# Patient Record
Sex: MALE | Race: WHITE | Employment: FULL TIME | URBAN - METROPOLITAN AREA
[De-identification: names, ages, dates, MRNs, and addresses within clinical notes are randomized per-mention and may not be internally consistent; named-entity substitution may affect disease eponyms.]

---

## 2017-02-27 ENCOUNTER — OFFICE VISIT (OUTPATIENT)
Dept: FAMILY MEDICINE CLINIC | Facility: CLINIC | Age: 32
End: 2017-02-27

## 2017-02-27 VITALS
OXYGEN SATURATION: 99 % | TEMPERATURE: 99.8 F | BODY MASS INDEX: 30.8 KG/M2 | HEIGHT: 71 IN | DIASTOLIC BLOOD PRESSURE: 79 MMHG | SYSTOLIC BLOOD PRESSURE: 153 MMHG | HEART RATE: 104 BPM | RESPIRATION RATE: 18 BRPM | WEIGHT: 220 LBS

## 2017-02-27 DIAGNOSIS — J11.1 INFLUENZA: Primary | ICD-10-CM

## 2017-02-27 RX ORDER — OSELTAMIVIR PHOSPHATE 75 MG/1
75 CAPSULE ORAL 2 TIMES DAILY
Qty: 10 CAP | Refills: 0 | Status: SHIPPED | OUTPATIENT
Start: 2017-02-27 | End: 2017-03-04

## 2017-02-27 RX ORDER — CODEINE PHOSPHATE AND GUAIFENESIN 10; 100 MG/5ML; MG/5ML
5 SOLUTION ORAL
Qty: 120 ML | Refills: 0 | Status: SHIPPED | OUTPATIENT
Start: 2017-02-27 | End: 2017-06-20

## 2017-02-27 NOTE — PATIENT INSTRUCTIONS
Influenza (Flu): Care Instructions  Your Care Instructions  Influenza (flu) is an infection in the lungs and breathing passages. It is caused by the influenza virus. There are different strains, or types, of the flu virus from year to year. Unlike the common cold, the flu comes on suddenly and the symptoms, such as a cough, congestion, fever, chills, fatigue, aches, and pains, are more severe. These symptoms may last up to 10 days. Although the flu can make you feel very sick, it usually doesn't cause serious health problems. Home treatment is usually all you need for flu symptoms. But your doctor may prescribe antiviral medicine to prevent other health problems, such as pneumonia, from developing. Older people and those who have a long-term health condition, such as lung disease, are most at risk for having pneumonia or other health problems. Follow-up care is a key part of your treatment and safety. Be sure to make and go to all appointments, and call your doctor if you are having problems. Its also a good idea to know your test results and keep a list of the medicines you take. How can you care for yourself at home? · Get plenty of rest.  · Drink plenty of fluids, enough so that your urine is light yellow or clear like water. If you have kidney, heart, or liver disease and have to limit fluids, talk with your doctor before you increase the amount of fluids you drink. · Take an over-the-counter pain medicine if needed, such as acetaminophen (Tylenol), ibuprofen (Advil, Motrin), or naproxen (Aleve), to relieve fever, headache, and muscle aches. Read and follow all instructions on the label. No one younger than 20 should take aspirin. It has been linked to Reye syndrome, a serious illness. · Do not smoke. Smoking can make the flu worse. If you need help quitting, talk to your doctor about stop-smoking programs and medicines. These can increase your chances of quitting for good.   · Breathe moist air from a hot shower or from a sink filled with hot water to help clear a stuffy nose. · Before you use cough and cold medicines, check the label. These medicines may not be safe for young children or for people with certain health problems. · If the skin around your nose and lips becomes sore, put some petroleum jelly on the area. · To ease coughing:  ¨ Drink fluids to soothe a scratchy throat. ¨ Suck on cough drops or plain hard candy. ¨ Take an over-the-counter cough medicine that contains dextromethorphan to help you get some sleep. Read and follow all instructions on the label. ¨ Raise your head at night with an extra pillow. This may help you rest if coughing keeps you awake. · Take any prescribed medicine exactly as directed. Call your doctor if you think you are having a problem with your medicine. To avoid spreading the flu  · Wash your hands regularly, and keep your hands away from your face. · Stay home from school, work, and other public places until you are feeling better and your fever has been gone for at least 24 hours. The fever needs to have gone away on its own without the help of medicine. · Ask people living with you to talk to their doctors about preventing the flu. They may get antiviral medicine to keep from getting the flu from you. · To prevent the flu in the future, get a flu vaccine every fall. Encourage people living with you to get the vaccine. · Cover your mouth when you cough or sneeze. When should you call for help? Call 911 anytime you think you may need emergency care. For example, call if:  · You have severe trouble breathing. Call your doctor now or seek immediate medical care if:  · You have new or worse trouble breathing. · You seem to be getting much sicker. · You feel very sleepy or confused. · You have a new or higher fever. · You get a new rash.   Watch closely for changes in your health, and be sure to contact your doctor if:  · You begin to get better and then get worse. · You are not getting better after 1 week. Where can you learn more? Go to http://gregorio-paresh.info/. Enter U540 in the search box to learn more about \"Influenza (Flu): Care Instructions. \"  Current as of: May 23, 2016  Content Version: 11.1  © 9947-3501 [employer]. Care instructions adapted under license by [employer] (which disclaims liability or warranty for this information). If you have questions about a medical condition or this instruction, always ask your healthcare professional. [address] any warranty or liability for your use of this information.

## 2017-02-27 NOTE — PROGRESS NOTES
HISTORY OF PRESENT ILLNESS  Madelyn Herbert is a 27 y.o. male. HPI Comments: Presents with 2 days history of cough, sore throat, myalgias, chills. No measured temperatures. His wife is positive for the flu. He has taken Nyquil/Dayquil, and these help somewhat. Flu    Associated symptoms include congestion, headaches, sore throat and cough. Pertinent negatives include no chest pain, no vomiting and no shortness of breath. Past Medical History:   Diagnosis Date    Gout     Hypertension        History reviewed. No pertinent surgical history. History   Smoking Status    Never Smoker   Smokeless Tobacco    Never Used     Current Outpatient Prescriptions   Medication Sig    lisinopril (PRINIVIL, ZESTRIL) 10 mg tablet Take 1 Tab by mouth daily. Indications: HYPERTENSION    meloxicam (MOBIC) 15 mg tablet Take 1 Tab by mouth daily. Indications: GOUT     No current facility-administered medications for this visit. Review of Systems   Constitutional: Positive for chills and malaise/fatigue. HENT: Positive for congestion and sore throat. Negative for ear pain. Respiratory: Positive for cough and sputum production. Negative for shortness of breath. Cardiovascular: Negative for chest pain. Gastrointestinal: Negative for nausea and vomiting. Musculoskeletal: Positive for myalgias. Neurological: Positive for headaches. Visit Vitals    /79    Pulse (!) 104    Temp 99.8 °F (37.7 °C) (Oral)    Resp 18    Ht 5' 11\" (1.803 m)    Wt 220 lb (99.8 kg)    SpO2 99%    BMI 30.68 kg/m2       Physical Exam   Constitutional: He is oriented to person, place, and time. He appears well-developed and well-nourished. No distress. Somewhat flushed   HENT:   Right Ear: Tympanic membrane, external ear and ear canal normal.   Left Ear: Tympanic membrane, external ear and ear canal normal.   Nose: Mucosal edema present. Mouth/Throat: Oropharynx is clear and moist.   Neck: Neck supple.  No thyromegaly present. Cardiovascular: Normal rate and regular rhythm. Exam reveals no gallop and no friction rub. No murmur heard. Pulmonary/Chest: Effort normal and breath sounds normal. No respiratory distress. Lymphadenopathy:     He has no cervical adenopathy. Neurological: He is alert and oriented to person, place, and time. Skin: Skin is warm and dry. Psychiatric: He has a normal mood and affect. His behavior is normal. Judgment and thought content normal.       ASSESSMENT and PLAN    ICD-10-CM ICD-9-CM    1. Influenza J11.1 487.1 oseltamivir (TAMIFLU) 75 mg capsule      guaiFENesin-codeine (ROBITUSSIN AC) 100-10 mg/5 mL solution     Follow-up Disposition:  Return if symptoms worsen or fail to improve. He should make an appointment to see me in 2-3 weeks for his HTN  The following changes in treatment are made: Tamiflu, Robitussin AC for symptoms. Push fluids, frequent handwashing,fever control. He declines a note for work. reviewed medications and side effects in detail  Plan of care reviewed - patient verbalize(s) understanding and agreement.

## 2017-06-20 ENCOUNTER — OFFICE VISIT (OUTPATIENT)
Dept: FAMILY MEDICINE CLINIC | Facility: CLINIC | Age: 32
End: 2017-06-20

## 2017-06-20 VITALS
RESPIRATION RATE: 16 BRPM | HEIGHT: 71 IN | OXYGEN SATURATION: 97 % | TEMPERATURE: 96.6 F | BODY MASS INDEX: 30.94 KG/M2 | WEIGHT: 221 LBS | HEART RATE: 83 BPM | SYSTOLIC BLOOD PRESSURE: 128 MMHG | DIASTOLIC BLOOD PRESSURE: 83 MMHG

## 2017-06-20 DIAGNOSIS — I10 ESSENTIAL HYPERTENSION WITH GOAL BLOOD PRESSURE LESS THAN 140/90: ICD-10-CM

## 2017-06-20 DIAGNOSIS — M10.9 ACUTE GOUT OF RIGHT FOOT, UNSPECIFIED CAUSE: Primary | ICD-10-CM

## 2017-06-20 RX ORDER — ALLOPURINOL 100 MG/1
100 TABLET ORAL DAILY
Qty: 90 TAB | Refills: 0 | Status: SHIPPED | OUTPATIENT
Start: 2017-06-20 | End: 2017-11-22 | Stop reason: SDUPTHER

## 2017-06-20 RX ORDER — LISINOPRIL 10 MG/1
10 TABLET ORAL DAILY
Qty: 90 TAB | Refills: 0 | Status: SHIPPED | OUTPATIENT
Start: 2017-06-20 | End: 2017-10-08 | Stop reason: SDUPTHER

## 2017-06-20 RX ORDER — MELOXICAM 15 MG/1
15 TABLET ORAL DAILY
Qty: 90 TAB | Refills: 3 | Status: SHIPPED | OUTPATIENT
Start: 2017-06-20 | End: 2017-10-10 | Stop reason: SDUPTHER

## 2017-06-20 NOTE — MR AVS SNAPSHOT
Visit Information Date & Time Provider Department Dept. Phone Encounter #  
 6/20/2017  2:15 PM Geeta Christopher MD [facility] [phone] [id number] Follow-up Instructions Return in about 3 months (around 9/4/2017). Upcoming Health Maintenance Date Due DTaP/Tdap/Td series (1 - Tdap) 2/25/2004 INFLUENZA AGE 9 TO ADULT 8/1/2017 Allergies as of 6/20/2017  Review Complete On: 6/20/2017 By: Geeta Christopher MD  
 No Known Allergies Current Immunizations  Never Reviewed No immunizations on file. Not reviewed this visit You Were Diagnosed With   
  
 Codes Comments Acute gout of right foot, unspecified cause    -  Primary ICD-10-CM: M10.9 ICD-9-CM: 274.01 Vitals BP Pulse Temp Resp Height(growth percentile) Weight(growth percentile) 128/83 83 96.6 °F (35.9 °C) 16 5' 11\" (1.803 m) 221 lb (100.2 kg) SpO2 BMI Smoking Status 97% 30.82 kg/m2 Never Smoker Vitals History BMI and BSA Data Body Mass Index Body Surface Area  
 30.82 kg/m 2 2.24 m 2 Preferred Pharmacy Pharmacy Name Phone [facility] [phone] [place] [address] [phone] Your Updated Medication List  
  
   
This list is accurate as of: 6/20/17  3:09 PM.  Always use your most recent med list.  
  
  
  
  
 allopurinol 100 mg tablet Commonly known as:  Jennifer Cano Take 1 Tab by mouth daily. Indications: prevention of acute gout attack  
  
 lisinopril 10 mg tablet Commonly known as:  Zahida Camel Take 1 Tab by mouth daily. Indications: HYPERTENSION  
  
 meloxicam 15 mg tablet Commonly known as:  MOBIC Take 1 Tab by mouth daily. Indications: GOUT Prescriptions Sent to Pharmacy Refills  
 meloxicam (MOBIC) 15 mg tablet 3 Sig: Take 1 Tab by mouth daily. Indications: GOUT  Class: Normal  
 Pharmacy: [facility] [address] [facility] [address] Ph #: [phone] Route: Oral  
 allopurinol (ZYLOPRIM) 100 mg tablet 0 Sig: Take 1 Tab by mouth daily. Indications: prevention of acute gout attack Class: Normal  
 Pharmacy: [facility]  [address] Ph #: [phone] Route: Oral  
  
Follow-up Instructions Return in about 3 months (around 9/4/2017). Introducing [facility]! [facility] introduces VODECLIC patient portal. Now you can access parts of your medical record, email your doctor's office, and request medication refills online. 1. In your internet browser, go to [email or web address] 2. Click on the First Time User? Click Here link in the Sign In box. You will see the New Member Sign Up page. 3. Enter your VODECLIC Access Code exactly as it appears below. You will not need to use this code after youve completed the sign-up process. If you do not sign up before the expiration date, you must request a new code. · VODECLIC Access Code: [email or web address] Expires: 9/18/2017  3:09 PM 
 
4. Enter the last four digits of your Social Security Number (xxxx) and Date of Birth (mm/dd/yyyy) as indicated and click Submit. You will be taken to the next sign-up page. 5. Create a Walltikt ID. This will be your VODECLIC login ID and cannot be changed, so think of one that is secure and easy to remember. 6. Create a VODECLIC password. You can change your password at any time. 7. Enter your Password Reset Question and Answer. This can be used at a later time if you forget your password. 8. Enter your e-mail address. You will receive e-mail notification when new information is available in [address]. 9. Click Sign Up. You can now view and download portions of your medical record. 10. Click the Download Summary menu link to download a portable copy of your medical information. If you have questions, please visit the Frequently Asked Questions section of the Bionomics website. Remember, Bionomics is NOT to be used for urgent needs. For medical emergencies, dial 911. Now available from your iPhone and Android! Please provide this summary of care documentation to your next provider. Your primary care clinician is listed as Aguilar Velasco. If you have any questions after today's visit, please call [phone].

## 2017-06-20 NOTE — LETTER
6/20/2017 3:24 PM 
 
Mr. Betsy Cook 
[address] [facility] [id number] Mr. Flory Hurtado was seen in the office today. H.susan was off work for the past 2-3 days, and may return to work tomorrow Sincerely, Ny Morgan MD

## 2017-06-20 NOTE — PROGRESS NOTES
HISTORY OF PRESENT ILLNESS  Madelyn Ross is a 27 y.o. male. HPI Comments: Presents with acute gout attack involving his right foot (laterally). He noted redness, warmth, tenderness and pain 2 days ago. He took some Colchicine, and it has now abated (still a little bit tender). This is the first episode he has had in the past year. He stopped taking the previously prescribed Allopurinol, because he wasn't any episodes. He is also out of Meloxicam. He needs a note for work. He is taking his BP medication regularly. Gout   Pertinent negatives include no chest pain, no headaches and no shortness of breath. Past Medical History:   Diagnosis Date    Gout     Hypertension        History reviewed. No pertinent surgical history. History   Smoking Status    Never Smoker   Smokeless Tobacco    Never Used     Current Outpatient Prescriptions   Medication Sig    lisinopril (PRINIVIL, ZESTRIL) 10 mg tablet Take 1 Tab by mouth daily. Indications: HYPERTENSION    meloxicam (MOBIC) 15 mg tablet Take 1 Tab by mouth daily. Indications: GOUT     No current facility-administered medications for this visit. Review of Systems   Constitutional: Negative for chills and fever. Respiratory: Negative for shortness of breath. Cardiovascular: Negative for chest pain, palpitations and leg swelling. Gastrointestinal: Negative for nausea and vomiting. Musculoskeletal: Positive for gout. Neurological: Negative for tingling, focal weakness and headaches. Psychiatric/Behavioral: The patient does not have insomnia. Visit Vitals    /83    Pulse 83    Temp 96.6 °F (35.9 °C)    Resp 16    Ht 5' 11\" (1.803 m)    Wt 221 lb (100.2 kg)    SpO2 97%    BMI 30.82 kg/m2       Physical Exam   Constitutional: He is oriented to person, place, and time. He appears well-developed and well-nourished. No distress. Neck: Neck supple. No thyromegaly present. Cardiovascular: Normal rate and regular rhythm.   Exam reveals no gallop and no friction rub. No murmur heard. Pulmonary/Chest: Effort normal and breath sounds normal. No respiratory distress. Musculoskeletal: He exhibits no edema. Right foot: There is no tenderness and no swelling. Feet:    Lymphadenopathy:     He has no cervical adenopathy. Neurological: He is alert and oriented to person, place, and time. Skin: Skin is warm and dry. Psychiatric: He has a normal mood and affect. His behavior is normal. Judgment and thought content normal.       ASSESSMENT and PLAN    ICD-10-CM ICD-9-CM    1. Acute gout of right foot, unspecified cause M10.9 274.01 meloxicam (MOBIC) 15 mg tablet      allopurinol (ZYLOPRIM) 100 mg tablet   2. Essential hypertension with goal blood pressure less than 140/90 I10 401.9 lisinopril (PRINIVIL, ZESTRIL) 10 mg tablet     Follow-up Disposition:  Return in about 3 months (around 9/4/2017). the following changes in treatment are made: Resume Allopurinol - discussed the need to continue this indefinitely. Lisinopril refilled for 90 days, Meloxicam refilled. He has some colchicine at home for acute use (can't afford to get ColCrys). Will check his uric acid at his next visit. reviewed medications and side effects in detail  Plan of care reviewed - patient verbalize(s) understanding and agreement.

## 2017-08-25 DIAGNOSIS — B00.1 HERPES LABIALIS: Primary | ICD-10-CM

## 2017-08-25 RX ORDER — VALACYCLOVIR HYDROCHLORIDE 1 G/1
2000 TABLET, FILM COATED ORAL 2 TIMES DAILY
Qty: 4 TAB | Refills: 5 | Status: SHIPPED | OUTPATIENT
Start: 2017-08-25 | End: 2018-07-30 | Stop reason: SDUPTHER

## 2017-08-25 NOTE — PROGRESS NOTES
Patient called to request a prescription for Valtrex for an oral HSV outbreak this week. He has taken this in the past with good results. Will do this for him.

## 2017-10-08 DIAGNOSIS — I10 ESSENTIAL HYPERTENSION WITH GOAL BLOOD PRESSURE LESS THAN 140/90: ICD-10-CM

## 2017-10-10 RX ORDER — LISINOPRIL 10 MG/1
TABLET ORAL
Qty: 90 TAB | Refills: 0 | Status: SHIPPED | OUTPATIENT
Start: 2017-10-10 | End: 2018-02-12 | Stop reason: SDUPTHER

## 2017-10-31 ENCOUNTER — OFFICE VISIT (OUTPATIENT)
Dept: FAMILY MEDICINE CLINIC | Facility: CLINIC | Age: 32
End: 2017-10-31

## 2017-10-31 VITALS
DIASTOLIC BLOOD PRESSURE: 82 MMHG | WEIGHT: 218 LBS | OXYGEN SATURATION: 99 % | BODY MASS INDEX: 30.52 KG/M2 | SYSTOLIC BLOOD PRESSURE: 108 MMHG | TEMPERATURE: 98.5 F | HEIGHT: 71 IN | HEART RATE: 87 BPM | RESPIRATION RATE: 16 BRPM

## 2017-10-31 DIAGNOSIS — K12.0 RECURRENT APHTHOUS ULCER: Primary | ICD-10-CM

## 2017-10-31 RX ORDER — DEXAMETHASONE 0.5 MG/5ML
SOLUTION ORAL
Qty: 240 ML | Refills: 2 | Status: SHIPPED | OUTPATIENT
Start: 2017-10-31 | End: 2018-02-12

## 2017-10-31 NOTE — MR AVS SNAPSHOT
Visit Information Date & Time Provider Department Dept. Phone Encounter #  
 10/31/2017  3:15 PM Mandi Caldwell MD [facility] [phone] [id number] Follow-up Instructions Return if symptoms worsen or fail to improve. Upcoming Health Maintenance Date Due DTaP/Tdap/Td series (1 - Tdap) 2/25/2004 INFLUENZA AGE 9 TO ADULT 8/1/2017 Allergies as of 10/31/2017  Review Complete On: 10/31/2017 By: Mandi Caldwell MD  
 No Known Allergies Current Immunizations  Never Reviewed No immunizations on file. Not reviewed this visit You Were Diagnosed With   
  
 Codes Comments Recurrent aphthous ulcer    -  Primary ICD-10-CM: K12.0 ICD-9-CM: 528.2 Vitals BP Pulse Temp Resp Height(growth percentile) Weight(growth percentile) 108/82 87 98.5 °F (36.9 °C) 16 5' 11\" (1.803 m) 218 lb (98.9 kg) SpO2 BMI Smoking Status 99% 30.4 kg/m2 Never Smoker Vitals History BMI and BSA Data Body Mass Index Body Surface Area  
 30.4 kg/m 2 2.23 m 2 Preferred Pharmacy Pharmacy Name Phone [facility] [address] [place] [address] [phone] Your Updated Medication List  
  
   
This list is accurate as of: 10/31/17  4:08 PM.  Always use your most recent med list.  
  
  
  
  
 allopurinol 100 mg tablet Commonly known as:  Ladonna Butler Take 1 Tab by mouth daily. Indications: prevention of acute gout attack  
  
 dexamethasone 0.5 mg/5 mL solution Commonly known as:  DECADRON Swish and spit 5ml qid - hold in mouth for 5 minutes, do not rinse  
  
 lisinopril 10 mg tablet Commonly known as:  PRINIVIL, ZESTRIL  
TAKE 1 TABLET BY MOUTH DAILY FOR HIGH BLOOD PRESSURE  
  
 meloxicam 15 mg tablet Commonly known as:  MOBIC  
TAKE 1 TABLET BY MOUTH DAILY FOR GOUT  
  
 valACYclovir 1 gram tablet Commonly known as:  VALTREX Take 2 Tabs by mouth two (2) times a day. Indications: HERPES LABIALIS Prescriptions Sent to Pharmacy Refills  
 dexamethasone (DECADRON) 0.5 mg/5 mL solution 2 Sig: Swish and spit 5ml qid - hold in mouth for 5 minutes, do not rinse Class: Normal  
 Pharmacy: [facility]  [address] Ph #: [phone] Follow-up Instructions Return if symptoms worsen or fail to improve. Patient Instructions Canker Sore: Care Instructions Your Care Instructions Canker sores are painful white sores in the mouth. They usually begin with a tingling feeling, followed by a red spot or bump that turns white. Canker sores appear most often on the tongue, inside the cheeks, and inside the lips. They can be very painful and can make talking, eating, and drinking difficult. A canker sore may form after an injury or stretching of tissues in the mouth, which can happen, for example, during a dental procedure or teeth cleaning. If you accidentally bite your tongue or the inside of your cheek, you may end up with a canker sore. Other possible causes are infection, certain foods, and stress. Canker sores are not contagious. The pain from your canker sore should decrease in 7 to 10 days, and it should heal completely in 1 to 3 weeks. In most cases, a canker sore will go away by itself. Home treatment can ease pain and discomfort. If you have a large or deep canker sore that does not seem to be getting better after 2 weeks, your doctor may prescribe medicine. Canker sores often come back again. Follow-up care is a key part of your treatment and safety. Be sure to make and go to all appointments, and call your doctor if you are having problems. It's also a good idea to know your test results and keep a list of the medicines you take. How can you care for yourself at home? · Drink cold liquids, such as water or iced tea, or eat flavored ice pops or frozen juices. Use a straw to keep the liquid from coming in contact with your canker sore. · Eat soft, bland foods that are easy to chew and swallow, such as ice cream, custard, applesauce, cottage cheese, macaroni and cheese, soft-cooked eggs, yogurt, or cream soups. · Cut foods into small pieces, or grind, mash, blend, or puree foods to make them easier to chew and swallow. · While your canker sore heals, avoid coffee, chocolate, spicy and salty foods, citrus fruits, nuts, seeds, and tomatoes. · To soothe your canker sore and help it heal: ¨ Use an over-the-counter numbing medicine, such as Orabase or Anbesol. ¨ Dab a bit of Milk of Magnesia on the canker sore 3 or 4 times a day. · Put ice on your sore to reduce the pain. · Take anti-inflammatory medicines to reduce pain, as needed. These include ibuprofen (Advil, Motrin) and naproxen (Aleve). Read and follow all instructions on the label. · Use a soft-bristle toothbrush, and brush your teeth well but carefully. · Do not smoke or use spit tobacco. Tobacco can cause mouth problems and slow healing. If you need help quitting, talk to your doctor about stop-smoking programs and medicines. These can increase your chances of quitting for good. When should you call for help? Call your doctor now or seek immediate medical care if: 
? · You have signs of infection, such as: 
¨ Increased pain, swelling, warmth, or redness. ¨ Red streaks leading from the area. ¨ Pus draining from the area. ¨ A fever. ? Watch closely for changes in your health, and be sure to contact your doctor if: 
? · You do not get better as expected. Where can you learn more? Go to http://gregorio-paresh.info/. Enter Z791 in the search box to learn more about \"Canker Sore: Care Instructions. \" Current as of: May 12, 2017 Content Version: 11.4 © 2298-9534 Healthwise, Incorporated. Care instructions adapted under license by [employer] (which disclaims liability or warranty for this information). If you have questions about a medical condition or this instruction, always ask your healthcare professional. [address] any warranty or liability for your use of this information. Introducing [facility]! [facility] introduces Ivisys patient portal. Now you can access parts of your medical record, email your doctor's office, and request medication refills online. 1. In your internet browser, go to [email or web address] 2. Click on the First Time User? Click Here link in the Sign In box. You will see the New Member Sign Up page. 3. Enter your Ivisys Access Code exactly as it appears below. You will not need to use this code after youve completed the sign-up process. If you do not sign up before the expiration date, you must request a new code. · Ivisys Access Code: [email or web address] Expires: 1/29/2018  4:08 PM 
 
4. Enter the last four digits of your Social Security Number (xxxx) and Date of Birth (mm/dd/yyyy) as indicated and click Submit. You will be taken to the next sign-up page. 5. Create a Ivisys ID. This will be your Ivisys login ID and cannot be changed, so think of one that is secure and easy to remember. 6. Create a Ivisys password. You can change your password at any time. 7. Enter your Password Reset Question and Answer. This can be used at a later time if you forget your password. 8. Enter your e-mail address. You will receive e-mail notification when new information is available in [address] 9. Click Sign Up. You can now view and download portions of your medical record. 10. Click the Download Summary menu link to download a portable copy of your medical information.  
 
If you have questions, please visit the Frequently Asked Questions section of the AppMakr. Remember, Resilinchart is NOT to be used for urgent needs. For medical emergencies, dial 911. Now available from your iPhone and Android! Please provide this summary of care documentation to your next provider. Your primary care clinician is listed as Obi Sebastian. If you have any questions after today's visit, please call [phone].

## 2017-10-31 NOTE — PATIENT INSTRUCTIONS
Canker Sore: Care Instructions  Your Care Instructions  Canker sores are painful white sores in the mouth. They usually begin with a tingling feeling, followed by a red spot or bump that turns white. Canker sores appear most often on the tongue, inside the cheeks, and inside the lips. They can be very painful and can make talking, eating, and drinking difficult. A canker sore may form after an injury or stretching of tissues in the mouth, which can happen, for example, during a dental procedure or teeth cleaning. If you accidentally bite your tongue or the inside of your cheek, you may end up with a canker sore. Other possible causes are infection, certain foods, and stress. Canker sores are not contagious. The pain from your canker sore should decrease in 7 to 10 days, and it should heal completely in 1 to 3 weeks. In most cases, a canker sore will go away by itself. Home treatment can ease pain and discomfort. If you have a large or deep canker sore that does not seem to be getting better after 2 weeks, your doctor may prescribe medicine. Canker sores often come back again. Follow-up care is a key part of your treatment and safety. Be sure to make and go to all appointments, and call your doctor if you are having problems. It's also a good idea to know your test results and keep a list of the medicines you take. How can you care for yourself at home? · Drink cold liquids, such as water or iced tea, or eat flavored ice pops or frozen juices. Use a straw to keep the liquid from coming in contact with your canker sore. · Eat soft, bland foods that are easy to chew and swallow, such as ice cream, custard, applesauce, cottage cheese, macaroni and cheese, soft-cooked eggs, yogurt, or cream soups. · Cut foods into small pieces, or grind, mash, blend, or puree foods to make them easier to chew and swallow.   · While your canker sore heals, avoid coffee, chocolate, spicy and salty foods, citrus fruits, nuts, seeds, and tomatoes. · To soothe your canker sore and help it heal:  ¨ Use an over-the-counter numbing medicine, such as Orabase or Anbesol. ¨ Dab a bit of Milk of Magnesia on the canker sore 3 or 4 times a day. · Put ice on your sore to reduce the pain. · Take anti-inflammatory medicines to reduce pain, as needed. These include ibuprofen (Advil, Motrin) and naproxen (Aleve). Read and follow all instructions on the label. · Use a soft-bristle toothbrush, and brush your teeth well but carefully. · Do not smoke or use spit tobacco. Tobacco can cause mouth problems and slow healing. If you need help quitting, talk to your doctor about stop-smoking programs and medicines. These can increase your chances of quitting for good. When should you call for help? Call your doctor now or seek immediate medical care if:  ? · You have signs of infection, such as:  ¨ Increased pain, swelling, warmth, or redness. ¨ Red streaks leading from the area. ¨ Pus draining from the area. ¨ A fever. ? Watch closely for changes in your health, and be sure to contact your doctor if:  ? · You do not get better as expected. Where can you learn more? Go to http://gregorio-paresh.info/. Enter R542 in the search box to learn more about \"Canker Sore: Care Instructions. \"  Current as of: May 12, 2017  Content Version: 11.4  © 5632-3812 "[employer]. Care instructions adapted under license by [employer] (which disclaims liability or warranty for this information). If you have questions about a medical condition or this instruction, always ask your healthcare professional. [place] [address] any warranty or liability for your use of this information.

## 2017-10-31 NOTE — PROGRESS NOTES
HISTORY OF PRESENT ILL NATHAN Abarca is a 27 y.o. male. HPI Comments: Presents with recurrent oral aphthous ulcers on his tongue and mouth for the past 2 days. He has tried OTC analgesics, numbing preparations without relief. TCN wasn't helpful in the past (has had these for at least 18 months). He has an appointment to see ENT about this on 11/21/17. Mouth Lesions   Pertinent negatives include no chest pain and no shortness of breath. Past Medical History:   Diagnosis Date    Gout     Hypertension        History reviewed. No pertinent surgical history. History   Smoking Status    Never Smoker   Smokeless Tobacco    Never Used     Current Outpatient Prescriptions   Medication Sig    meloxicam (MOBIC) 15 mg tablet TAKE 1 TABLET BY MOUTH DAILY FOR GOUT    lisinopril (PRINIVIL, ZESTRIL) 10 mg tablet TAKE 1 TABLET BY MOUTH DAILY FOR HIGH BLOOD PRESSURE    valACYclovir (VALTREX) 1 gram tablet Take 2 Tabs by mouth two (2) times a day. Indications: HERPES LABIALIS    allopurinol (ZYLOPRIM) 100 mg tablet Take 1 Tab by mouth daily. Indications: prevention of acute gout attack     No current facility-administered medications for this visit. Review of Systems   Constitutional: Negative for chills and fever. HENT: Negative for congestion and sore throat. Respiratory: Negative for cough and shortness of breath. Cardiovascular: Negative for chest pain. Gastrointestinal: Negative for nausea and vomiting. Visit Vitals    /82    Pulse 87    Temp 98.5 °F (36.9 °C)  Comment: axillary    Resp 16    Ht 5' 11\" (1.803 m)    Wt 218 lb (98.9 kg)    SpO2 99%    BMI 30.4 kg/m2       Physical Exam   Constitutional: He is oriented to person, place, and time. He appears well-developed and well-nourished. No distress. HENT:   Right Ear: Tympanic membrane, external ear and ear canal normal.   Left Ear: External ear and ear canal normal.   Mouth/Throat: Oral lesions present.  No uvula swelling. No oropharyngeal exudate, posterior oropharyngeal edema or posterior oropharyngeal erythema. Several aphthous ulcers on tongue, gums and inner lips   Neck: Neck supple. No thyromegaly present. Cardiovascular: Normal rate and regular rhythm. Exam reveals no gallop and no friction rub. No murmur heard. Pulmonary/Chest: Effort normal and breath sounds normal. No respiratory distress. Lymphadenopathy:     He has no cervical adenopathy. Neurological: He is alert and oriented to person, place, and time. Skin: Skin is warm and dry. Psychiatric: He has a normal mood and affect. His behavior is normal. Judgment and thought content normal.       ASSESSMENT and PLAN    ICD-10-CM ICD-9-CM    1. Recurrent aphthous ulcer K12.0 528.2 dexamethasone (DECADRON) 0.5 mg/5 mL solution     Follow-up Disposition:  Return if symptoms worsen or fail to improve. the following changes in treatment are made: trial of dexamethasone elixir swish and spit. reviewed medications and side effects in detail  Keep ENT appointment! Advised to get flu shot when he feels better. Plan of care reviewed - patient verbalize(s) understanding and agreement.

## 2018-02-12 ENCOUNTER — OFFICE VISIT (OUTPATIENT)
Dept: FAMILY MEDICINE CLINIC | Facility: CLINIC | Age: 33
End: 2018-02-12

## 2018-02-12 VITALS
OXYGEN SATURATION: 98 % | TEMPERATURE: 97.9 F | WEIGHT: 227 LBS | HEIGHT: 71 IN | BODY MASS INDEX: 31.78 KG/M2 | DIASTOLIC BLOOD PRESSURE: 93 MMHG | SYSTOLIC BLOOD PRESSURE: 131 MMHG | RESPIRATION RATE: 18 BRPM | HEART RATE: 74 BPM

## 2018-02-12 DIAGNOSIS — E66.9 OBESITY (BMI 30.0-34.9): ICD-10-CM

## 2018-02-12 DIAGNOSIS — I10 ESSENTIAL HYPERTENSION WITH GOAL BLOOD PRESSURE LESS THAN 140/90: Primary | ICD-10-CM

## 2018-02-12 DIAGNOSIS — Z23 ENCOUNTER FOR IMMUNIZATION: ICD-10-CM

## 2018-02-12 DIAGNOSIS — M10.071 IDIOPATHIC GOUT INVOLVING TOE OF RIGHT FOOT, UNSPECIFIED CHRONICITY: ICD-10-CM

## 2018-02-12 RX ORDER — LISINOPRIL 10 MG/1
10 TABLET ORAL DAILY
Qty: 90 TAB | Refills: 3 | Status: SHIPPED | OUTPATIENT
Start: 2018-02-12 | End: 2019-02-04 | Stop reason: SDUPTHER

## 2018-02-12 NOTE — PROGRESS NOTES
Identified pt with two pt identifiers(name and ). Reviewed record in preparation for visit and have obtained necessary documentation. Chief Complaint   Patient presents with    Medication Refill    Blood Pressure Check        Health Maintenance Due   Topic    DTaP/Tdap/Td series (1 - Tdap)    Influenza Age 5 to Adult       HM reviewed w/patient. Requested the Influenza Vaccine today. Coordination of Care Questionnaire:  :   1) Have you been to an emergency room, urgent care clinic since your last visit? no   Hospitalized since your last visit? no             2. Have seen or consulted any other health care provider since your last visit? No  If yes, where when, and reason for visit? 3) Do you have an Advanced Directive/ Living Will in place? No  If no, would you like information yes    Patient is accompanied by self.

## 2018-02-12 NOTE — PROGRESS NOTES
HISTORY OF PRESENT ILLNESS  Madelyn Luke is a 27 y.o. male. HPI Comments: Seen in follow-up for HTN, gout. No problems with medications, though he does need a refill. No gout episodes, though he sometimes gets sharp pains in his right knee. No further aphthous ulcers. He will get a flu shot today. Medication Refill   Pertinent negatives include no chest pain, no headaches and no shortness of breath. Blood Pressure Check   Pertinent negatives include no chest pain, no headaches and no shortness of breath. Past Medical History:   Diagnosis Date    Gout     Hypertension        History reviewed. No pertinent surgical history. History   Smoking Status    Never Smoker   Smokeless Tobacco    Never Used     Current Outpatient Prescriptions   Medication Sig    allopurinol (ZYLOPRIM) 100 mg tablet TAKE 1 TABLET BY MOUTH DAILY FOR GOUT    meloxicam (MOBIC) 15 mg tablet TAKE 1 TABLET BY MOUTH DAILY FOR GOUT    lisinopril (PRINIVIL, ZESTRIL) 10 mg tablet TAKE 1 TABLET BY MOUTH DAILY FOR HIGH BLOOD PRESSURE    valACYclovir (VALTREX) 1 gram tablet Take 2 Tabs by mouth two (2) times a day. Indications: HERPES LABIALIS     No current facility-administered medications for this visit. Review of Systems   Constitutional: Negative for chills and fever. Respiratory: Negative for shortness of breath. Cardiovascular: Negative for chest pain, palpitations and leg swelling. Gastrointestinal: Negative for nausea and vomiting. Neurological: Negative for tingling, focal weakness and headaches. Psychiatric/Behavioral: The patient does not have insomnia. Visit Vitals    BP (!) 131/93 (BP 1 Location: Right arm, BP Patient Position: Sitting)    Pulse 74    Temp 97.9 °F (36.6 °C) (Oral)    Resp 18    Ht 5' 11\" (1.803 m)    Wt 227 lb (103 kg)    SpO2 98%    BMI 31.66 kg/m2       Physical Exam   Constitutional: He is oriented to person, place, and time. He appears well-developed and well-nourished. No distress. Neck: Neck supple. No thyromegaly present. Carotid bruit absent   Cardiovascular: Normal rate, regular rhythm and intact distal pulses. Exam reveals no gallop and no friction rub. No murmur heard. Pulmonary/Chest: Effort normal and breath sounds normal. No respiratory distress. Musculoskeletal: He exhibits no edema. Lymphadenopathy:     He has no cervical adenopathy. Neurological: He is alert and oriented to person, place, and time. Skin: Skin is warm and dry. Psychiatric: He has a normal mood and affect. His behavior is normal. Judgment and thought content normal.   Nursing note and vitals reviewed. ASSESSMENT and PLAN    ICD-10-CM ICD-9-CM    1. Essential hypertension with goal blood pressure less than 140/90 I10 401.9 lisinopril (PRINIVIL, ZESTRIL) 10 mg tablet   2. Idiopathic gout involving toe of right foot, unspecified chronicity M10.071 274.9    3. Obesity (BMI 30.0-34. 9) E66.9 278.00    4. Encounter for immunization Z23 V03.89 INFLUENZA VIRUS VAC QUAD,SPLIT,PRESV FREE SYRINGE IM     Follow-up Disposition:  Return in about 3 months (around 5/12/2018). current treatment plan is effective, no change in therapy - refill as noted. reviewed diet, exercise and weight control  reviewed medications and side effects in detail  Flu shot today. Discussed the patient's BMI with him. The BMI follow up plan is as follows:     dietary management education, guidance, and counseling  encourage exercise  monitor weight  prescribed dietary intake    An After Visit Summary was printed and given to the patient. Plan of care reviewed - patient verbalize(s) understanding and agreement.

## 2018-02-12 NOTE — MR AVS SNAPSHOT
[address] 1 [facility] [address] 
[phone] Patient: Clint Shepherd MRN: [id number] Visit Information Date & Time Provider Department Dept. Phone Encounter #  
 2/12/2018  1:45 PM Jerod Lombardi MD [facility] [id number] Follow-up Instructions Return in about 3 months (around 5/12/2018). Upcoming Health Maintenance Date Due DTaP/Tdap/Td series (1 - Tdap) 2/25/2004 Allergies as of 2/12/2018  Review Complete On: 2/12/2018 By: Jerod Lombardi MD  
 No Known Allergies Current Immunizations  Reviewed on 2/12/2018 Name Date Influenza Vaccine 2/12/2018 Influenza Vaccine (Quad) PF 2/12/2018  1:57 PM  
  
 Reviewed by Manuel Bean LPN on 0/74/6869 at  1:47 PM  
 Reviewed by Manuel Bean LPN on 4/88/2327 at  1:57 PM  
You Were Diagnosed With   
  
 Codes Comments Essential hypertension with goal blood pressure less than 140/90    -  Primary ICD-10-CM: I10 
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  
 31.66 kg/m 2 2.27 m 2 Preferred Pharmacy Pharmacy Name Phone [facility] [phone] [place] [address] [phone] Your Updated Medication List  
  
   
 This list is accurate as of: 2/12/18  2:28 PM.  Always use your most recent med list.  
  
  
  
  
 allopurinol 100 mg tablet Commonly known as:  ZYLOPRIM  
TAKE 1 TABLET BY MOUTH DAILY FOR GOUT  
  
 lisinopril 10 mg tablet Commonly known as:  Matthew Headings Take 1 Tab by mouth daily. Indications: hypertension  
  
 meloxicam 15 mg tablet Commonly known as:  MOBIC  
TAKE 1 TABLET BY MOUTH DAILY FOR GOUT  
  
 valACYclovir 1 gram tablet Commonly known as:  VALTREX Take 2 Tabs by mouth two (2) times a day. Indications: HERPES LABIALIS Prescriptions Sent to Pharmacy Refills  
 lisinopril (PRINIVIL, ZESTRIL) 10 mg tablet 3 Sig: Take 1 Tab by mouth daily. Indications: hypertension Class: Normal  
 Pharmacy: [facility] [address] [place] [address] Kayy Layla [phone],[facility] Ph #: [phone] Route: Oral  
  
We Performed the Following INFLUENZA VIRUS VAC QUAD,SPLIT,PRESV FREE SYRINGE IM [id number] CPT(R)] Follow-up Instructions Return in about 3 months (around 5/12/2018). Patient Instructions Body Mass Index: Care Instructions Your Care Instructions Body mass index (BMI) can help you see if your weight is raising your risk for health problems. It uses a formula to compare how much you weigh with how tall you are. · A BMI lower than 18.5 is considered underweight. · A BMI between 18.5 and 24.9 is considered healthy. · A BMI between 25 and 29.9 is considered overweight. A BMI of 30 or higher is considered obese. If your BMI is in the normal range, it means that you have a lower risk for weight-related health problems. If your BMI is in the overweight or obese range, you may be at increased risk for weight-related health problems, such as high blood pressure, heart disease, stroke, arthritis or joint pain, and diabetes.  If your BMI is in the underweight range, you may be at increased risk for health problems such as fatigue, lower protection (immunity) against illness, muscle loss, bone loss, hair loss, and hormone problems. BMI is just one measure of your risk for weight-related health problems. You may be at higher risk for health problems if you are not active, you eat an unhealthy diet, or you drink too much alcohol or use tobacco products. Follow-up care is a key part of your treatment and safety. Be sure to make and go to all appointments, and call your doctor if you are having problems. It's also a good idea to know your test results and keep a list of the medicines you take. How can you care for yourself at home? · Practice healthy eating habits. This includes eating plenty of fruits, vegetables, whole grains, lean protein, and low-fat dairy. · If your doctor recommends it, get more exercise. Walking is a good choice. Bit by bit, increase the amount you walk every day. Try for at least 30 minutes on most days of the week. · Do not smoke. Smoking can increase your risk for health problems. If you need help quitting, talk to your doctor about stop-smoking programs and medicines. These can increase your chances of quitting for good. · Limit alcohol to 2 drinks a day for men and 1 drink a day for women. Too much alcohol can cause health problems. If you have a BMI higher than 25 · Your doctor may do other tests to check your risk for weight-related health problems. This may include measuring the distance around your waist. A waist measurement of more than 40 inches in men or 35 inches in women can increase the risk of weight-related health problems. · Talk with your doctor about steps you can take to stay healthy or improve your health. You may need to make lifestyle changes to lose weight and stay healthy, such as changing your diet and getting regular exercise. If you have a BMI lower than 18.5 · Your doctor may do other tests to check your risk for health problems. · Talk with your doctor about steps you can take to stay healthy or improve your health. You may need to make lifestyle changes to gain or maintain weight and stay healthy, such as getting more healthy foods in your diet and doing exercises to build muscle. Where can you learn more? Go to http://gregorio-paresh.info/. Enter S176 in the search box to learn more about \"Body Mass Index: Care Instructions. \" Current as of: October 13, 2016 Content Version: 11.4 © 1490-5901 "[employer]. Care instructions adapted under license by [employer]uishou (which disclaims liability or warranty for this information). If you have questions about a medical condition or this instruction, always ask your healthcare professional. [address] any warranty or liability for your use of this information. Starting a Weight Loss Plan: Care Instructions Your Care Instructions If you are thinking about losing weight, it can be hard to know where to start. Your doctor can help you set up a weight loss plan that best meets your needs. You may want to take a class on nutrition or exercise, or join a weight loss support group. If you have questions about how to make changes to your eating or exercise habits, ask your doctor about seeing a registered dietitian or an exercise specialist. 
It can be a big challenge to lose weight. But you do not have to make huge changes at once. Make small changes, and stick with them. When those changes become habit, add a few more changes. If you do not think you are ready to make changes right now, try to pick a date in the future. Make an appointment to see your doctor to discuss whether the time is right for you to start a plan. Follow-up care is a key part of your treatment and safety.  Be sure to make and go to all appointments, and call your doctor if you are having problems. It's also a good idea to know your test results and keep a list of the medicines you take. How can you care for yourself at home? · Set realistic goals. Many people expect to lose much more weight than is likely. A weight loss of 5% to 10% of your body weight may be enough to improve your health. · Get family and friends involved to provide support. Talk to them about why you are trying to lose weight, and ask them to help. They can help by participating in exercise and having meals with you, even if they may be eating something different. · Find what works best for you. If you do not have time or do not like to cook, a program that offers meal replacement bars or shakes may be better for you. Or if you like to prepare meals, finding a plan that includes daily menus and recipes may be best. 
· Ask your doctor about other health professionals who can help you achieve your weight loss goals. ¨ A dietitian can help you make healthy changes in your diet. ¨ An exercise specialist or  can help you develop a safe and effective exercise program. 
¨ A counselor or psychiatrist can help you cope with issues such as depression, anxiety, or family problems that can make it hard to focus on weight loss. · Consider joining a support group for people who are trying to lose weight. Your doctor can suggest groups in your area. Where can you learn more? Go to http://gregorio-paresh.info/. Enter M985 in the search box to learn more about \"Starting a Weight Loss Plan: Care Instructions. \" Current as of: October 13, 2016 Content Version: 11.4 © 8231-3667 Healthwise, Incorporated. Care instructions adapted under license by [employer] (which disclaims liability or warranty for this information).  If you have questions about a medical condition or this instruction, always ask your healthcare professional. [place] [address] any warranty or liability for your use of this information. Learning About Low-Carbohydrate Diets for Weight Loss What is a low-carbohydrate diet? Low-carb diets avoid foods that are high in carbohydrate. These high-carb foods include pasta, bread, rice, cereal, fruits, and starchy vegetables. Instead, these diets usually have you eat foods that are high in fat and protein. Many people lose weight quickly on a low-carb diet. But the early weight loss is water. People on this diet often gain the weight back after they start eating carbs again. Not all diet plans are safe or work well. A lot of the evidence shows that low-carb diets aren't healthy. That's because these diets often don't include healthy foods like fruits and vegetables. Losing weight safely means balancing protein, fat, and carbs with every meal and snack. And low-carb diets don't always provide the vitamins, minerals, and fiber you need. If you have a serious medical condition, talk to your doctor before you try any diet. These conditions include kidney disease, heart disease, type 2 diabetes, high cholesterol, and high blood pressure. If you are pregnant, it may not be safe for your baby if you are on a low-carb diet. How can you lose weight safely? You might have heard that a diet plan helped another person lose weight. But that doesn't mean that it will work for you. It is very hard to stay on a diet that includes lots of big changes in your eating habits. If you want to get to a healthy weight and stay there, making healthy lifestyle changes will often work better than dieting. These steps can help. · Make a plan for change. Work with your doctor to create a plan that is right for you. · See a dietitian. He or she can show you how to make healthy changes in your eating habits. · Manage stress. If you have a lot of stress in your life, it can be hard to focus on making healthy changes to your daily habits. · Track your food and activity. You are likely to do better at losing weight if you keep track of what you eat and what you do. Follow-up care is a key part of your treatment and safety. Be sure to make and go to all appointments, and call your doctor if you are having problems. It's also a good idea to know your test results and keep a list of the medicines you take. Where can you learn more? Go to http://gregorio-paresh.info/. Enter A121 in the search box to learn more about \"Learning About Low-Carbohydrate Diets for Weight Loss. \" Current as of: May 12, 2017 Content Version: 11.4 © 0325-8097 Healthwise, Incorporated. Care instructions adapted under license by [employer] (which disclaims liability or warranty for this information). If you have questions about a medical condition or this instruction, always ask your healthcare professional. [address] any warranty or liability for your use of this information. Introducing [facility]! Ignacia Ochoa introduces PEER patient portal. Now you can access parts of your medical record, email your doctor's office, and request medication refills online. 1. In your internet browser, go to [email or web address] 2. Click on the First Time User? Click Here link in the Sign In box. You will see the New Member Sign Up page. 3. Enter your PEER Access Code exactly as it appears below. You will not need to use this code after youve completed the sign-up process. If you do not sign up before the expiration date, you must request a new code. · PEER Access Code: [email or web address] Expires: 5/13/2018  2:25 PM 
 
4. Enter the last four digits of your Social Security Number (xxxx) and Date of Birth (mm/dd/yyyy) as indicated and click Submit.  You will be taken to the next sign-up page. 5. Create a Gullivearth ID. This will be your Gullivearth login ID and cannot be changed, so think of one that is secure and easy to remember. 6. Create a Gullivearth password. You can change your password at any time. 7. Enter your Password Reset Question and Answer. This can be used at a later time if you forget your password. 8. Enter your e-mail address. You will receive e-mail notification when new information is available in [address]. 9. Click Sign Up. You can now view and download portions of your medical record. 10. Click the Download Summary menu link to download a portable copy of your medical information. If you have questions, please visit the Frequently Asked Questions section of the Gullivearth website. Remember, Gullivearth is NOT to be used for urgent needs. For medical emergencies, dial 911. Now available from your iPhone and Android! Please provide this summary of care documentation to your next provider. Your primary care clinician is listed as Jayna Santos. If you have any questions after today's visit, please call [phone].

## 2018-02-12 NOTE — PATIENT INSTRUCTIONS
Body Mass Index: Care Instructions  Your Care Instructions    Body mass index (BMI) can help you see if your weight is raising your risk for health problems. It uses a formula to compare how much you weigh with how tall you are. · A BMI lower than 18.5 is considered underweight. · A BMI between 18.5 and 24.9 is considered healthy. · A BMI between 25 and 29.9 is considered overweight. A BMI of 30 or higher is considered obese. If your BMI is in the normal range, it means that you have a lower risk for weight-related health problems. If your BMI is in the overweight or obese range, you may be at increased risk for weight-related health problems, such as high blood pressure, heart disease, stroke, arthritis or joint pain, and diabetes. If your BMI is in the underweight range, you may be at increased risk for health problems such as fatigue, lower protection (immunity) against illness, muscle loss, bone loss, hair loss, and hormone problems. BMI is just one measure of your risk for weight-related health problems. You may be at higher risk for health problems if you are not active, you eat an unhealthy diet, or you drink too much alcohol or use tobacco products. Follow-up care is a key part of your treatment and safety. Be sure to make and go to all appointments, and call your doctor if you are having problems. It's also a good idea to know your test results and keep a list of the medicines you take. How can you care for yourself at home? · Practice healthy eating habits. This includes eating plenty of fruits, vegetables, whole grains, lean protein, and low-fat dairy. · If your doctor recommends it, get more exercise. Walking is a good choice. Bit by bit, increase the amount you walk every day. Try for at least 30 minutes on most days of the week. · Do not smoke. Smoking can increase your risk for health problems. If you need help quitting, talk to your doctor about stop-smoking programs and medicines. These can increase your chances of quitting for good. · Limit alcohol to 2 drinks a day for men and 1 drink a day for women. Too much alcohol can cause health problems. If you have a BMI higher than 25  · Your doctor may do other tests to check your risk for weight-related health problems. This may include measuring the distance around your waist. A waist measurement of more than 40 inches in men or 35 inches in women can increase the risk of weight-related health problems. · Talk with your doctor about steps you can take to stay healthy or improve your health. You may need to make lifestyle changes to lose weight and stay healthy, such as changing your diet and getting regular exercise. If you have a BMI lower than 18.5  · Your doctor may do other tests to check your risk for health problems. · Talk with your doctor about steps you can take to stay healthy or improve your health. You may need to make lifestyle changes to gain or maintain weight and stay healthy, such as getting more healthy foods in your diet and doing exercises to build muscle. Where can you learn more? Go to http://gregorio-paresh.info/. Enter S176 in the search box to learn more about \"Body Mass Index: Care Instructions. \"  Current as of: October 13, 2016  Content Version: 11.4  © 8962-4209 Healthwise, Incorporated. Care instructions adapted under license by [employer] (which disclaims liability or warranty for this information). If you have questions about a medical condition or this instruction, always ask your healthcare professional. [place] [address] any warranty or liability for your use of this information. Starting a Weight Loss Plan: Care Instructions  Your Care Instructions    If you are thinking about losing weight, it can be hard to know where to start. Your doctor can help you set up a weight loss plan that best meets your needs.  You may want to take a class on nutrition or exercise, or join a weight loss support group. If you have questions about how to make changes to your eating or exercise habits, ask your doctor about seeing a registered dietitian or an exercise specialist.  It can be a big challenge to lose weight. But you do not have to make huge changes at once. Make small changes, and stick with them. When those changes become habit, add a few more changes. If you do not think you are ready to make changes right now, try to pick a date in the future. Make an appointment to see your doctor to discuss whether the time is right for you to start a plan. Follow-up care is a key part of your treatment and safety. Be sure to make and go to all appointments, and call your doctor if you are having problems. It's also a good idea to know your test results and keep a list of the medicines you take. How can you care for yourself at home? · Set realistic goals. Many people expect to lose much more weight than is likely. A weight loss of 5% to 10% of your body weight may be enough to improve your health. · Get family and friends involved to provide support. Talk to them about why you are trying to lose weight, and ask them to help. They can help by participating in exercise and having meals with you, even if they may be eating something different. · Find what works best for you. If you do not have time or do not like to cook, a program that offers meal replacement bars or shakes may be better for you. Or if you like to prepare meals, finding a plan that includes daily menus and recipes may be best.  · Ask your doctor about other health professionals who can help you achieve your weight loss goals. ¨ A dietitian can help you make healthy changes in your diet.   ¨ An exercise specialist or  can help you develop a safe and effective exercise program.  ¨ A counselor or psychiatrist can help you cope with issues such as depression, anxiety, or family problems that can make it hard to focus on weight loss. · Consider joining a support group for people who are trying to lose weight. Your doctor can suggest groups in your area. Where can you learn more? Go to http://gregorio-paresh.info/. Enter B500 in the search box to learn more about \"Starting a Weight Loss Plan: Care Instructions. \"  Current as of: October 13, 2016  Content Version: 11.4  © 0776-6547 [employer]. Care instructions adapted under license by [employer] (which disclaims liability or warranty for this information). If you have questions about a medical condition or this instruction, always ask your healthcare professional. [address] any warranty or liability for your use of this information. Learning About Low-Carbohydrate Diets for Weight Loss  What is a low-carbohydrate diet? Low-carb diets avoid foods that are high in carbohydrate. These high-carb foods include pasta, bread, rice, cereal, fruits, and starchy vegetables. Instead, these diets usually have you eat foods that are high in fat and protein. Many people lose weight quickly on a low-carb diet. But the early weight loss is water. People on this diet often gain the weight back after they start eating carbs again. Not all diet plans are safe or work well. A lot of the evidence shows that low-carb diets aren't healthy. That's because these diets often don't include healthy foods like fruits and vegetables. Losing weight safely means balancing protein, fat, and carbs with every meal and snack. And low-carb diets don't always provide the vitamins, minerals, and fiber you need. If you have a serious medical condition, talk to your doctor before you try any diet. These conditions include kidney disease, heart disease, type 2 diabetes, high cholesterol, and high blood pressure. If you are pregnant, it may not be safe for your baby if you are on a low-carb diet.   How can you lose weight safely? You might have heard that a diet plan helped another person lose weight. But that doesn't mean that it will work for you. It is very hard to stay on a diet that includes lots of big changes in your eating habits. If you want to get to a healthy weight and stay there, making healthy lifestyle changes will often work better than dieting. These steps can help. · Make a plan for change. Work with your doctor to create a plan that is right for you. · See a dietitian. He or she can show you how to make healthy changes in your eating habits. · Manage stress. If you have a lot of stress in your life, it can be hard to focus on making healthy changes to your daily habits. · Track your food and activity. You are likely to do better at losing weight if you keep track of what you eat and what you do. Follow-up care is a key part of your treatment and safety. Be sure to make and go to all appointments, and call your doctor if you are having problems. It's also a good idea to know your test results and keep a list of the medicines you take. Where can you learn more? Go to http://gregorio-paresh.info/. Enter A121 in the search box to learn more about \"Learning About Low-Carbohydrate Diets for Weight Loss. \"  Current as of: May 12, 2017  Content Version: 11.4  © 0441-5643 Healthwise, Incorporated. Care instructions adapted under license by [employer] (which disclaims liability or warranty for this information). If you have questions about a medical condition or this instruction, always ask your healthcare professional. [address] any warranty or liability for your use of this information.

## 2018-07-30 ENCOUNTER — OFFICE VISIT (OUTPATIENT)
Dept: FAMILY MEDICINE CLINIC | Facility: CLINIC | Age: 33
End: 2018-07-30

## 2018-07-30 VITALS
SYSTOLIC BLOOD PRESSURE: 119 MMHG | HEART RATE: 73 BPM | BODY MASS INDEX: 28.92 KG/M2 | OXYGEN SATURATION: 99 % | DIASTOLIC BLOOD PRESSURE: 77 MMHG | WEIGHT: 206.6 LBS | RESPIRATION RATE: 18 BRPM | TEMPERATURE: 97 F | HEIGHT: 71 IN

## 2018-07-30 DIAGNOSIS — J06.9 VIRAL URI: Primary | ICD-10-CM

## 2018-07-30 DIAGNOSIS — I10 ESSENTIAL HYPERTENSION WITH GOAL BLOOD PRESSURE LESS THAN 140/90: ICD-10-CM

## 2018-07-30 DIAGNOSIS — M10.071 IDIOPATHIC GOUT INVOLVING TOE OF RIGHT FOOT, UNSPECIFIED CHRONICITY: ICD-10-CM

## 2018-07-30 DIAGNOSIS — B00.1 HERPES LABIALIS: ICD-10-CM

## 2018-07-30 PROBLEM — E66.3 OVERWEIGHT (BMI 25.0-29.9): Status: ACTIVE | Noted: 2018-02-12

## 2018-07-30 LAB
S PYO AG THROAT QL: NEGATIVE
VALID INTERNAL CONTROL?: YES

## 2018-07-30 RX ORDER — VALACYCLOVIR HYDROCHLORIDE 1 G/1
2000 TABLET, FILM COATED ORAL 2 TIMES DAILY
Qty: 4 TAB | Refills: 5 | Status: SHIPPED | OUTPATIENT
Start: 2018-07-30 | End: 2020-11-12 | Stop reason: ALTCHOICE

## 2018-07-30 RX ORDER — LIDOCAINE HYDROCHLORIDE 20 MG/ML
15 SOLUTION OROPHARYNGEAL
Qty: 1 BOTTLE | Refills: 0 | Status: SHIPPED | OUTPATIENT
Start: 2018-07-30 | End: 2018-09-25 | Stop reason: SDUPTHER

## 2018-07-30 NOTE — PROGRESS NOTES
HISTORY OF PRESENT ILLNESS Calista Sanchez is a 26 y.o. male. HPI Comments: Seen in follow-up for HTN, gout. No problems with medications. He has been exercising regularly and losing weight. No gout episodes. Also concerned about sore throat for the past 3-4 days, with occasional cough - no fever or nasal congestion. No ill contacts. It hurts to swallow. He has been using Ibuprofen for this, but it doesn't help. He is due for refill of Valtrex for herpes labialis - effective for outbreaks. Sore Throat Associated symptoms include cough. Pertinent negatives include no vomiting, no congestion, no ear pain, no headaches and no shortness of breath. Past Medical History:  
Diagnosis Date  Gout  Hypertension History reviewed. No pertinent surgical history. History Smoking Status  Never Smoker Smokeless Tobacco  
 Never Used Current Outpatient Prescriptions Medication Sig  
 meloxicam (MOBIC) 15 mg tablet TAKE 1 TABLET BY MOUTH DAILY FOR GOUT  
 lisinopril (PRINIVIL, ZESTRIL) 10 mg tablet Take 1 Tab by mouth daily. Indications: hypertension  allopurinol (ZYLOPRIM) 100 mg tablet TAKE 1 TABLET BY MOUTH DAILY FOR GOUT  
 valACYclovir (VALTREX) 1 gram tablet Take 2 Tabs by mouth two (2) times a day. Indications: HERPES LABIALIS No current facility-administered medications for this visit. Review of Systems Constitutional: Negative for chills and fever. HENT: Positive for sore throat. Negative for congestion and ear pain. Respiratory: Positive for cough. Negative for shortness of breath. Cardiovascular: Negative for chest pain, palpitations and leg swelling. Gastrointestinal: Negative for nausea and vomiting. Musculoskeletal: Negative for myalgias. Neurological: Negative for tingling, focal weakness and headaches. Psychiatric/Behavioral: The patient does not have insomnia. Visit Vitals  /77  Pulse 73  Temp 97 °F (36.1 °C) (Oral)  Resp 18  Ht 5' 11\" (1.803 m)  Wt 206 lb 9.6 oz (93.7 kg)  SpO2 99%  BMI 28.81 kg/m2 Physical Exam  
Constitutional: He is oriented to person, place, and time. He appears well-developed and well-nourished. No distress. HENT:  
Nose: Mucosal edema present. Mouth/Throat: Oropharynx is clear and moist.  
Both ear canals full of wax. Neck: Neck supple. No thyromegaly present. Carotid bruit absent Cardiovascular: Normal rate, regular rhythm and intact distal pulses. Exam reveals no gallop and no friction rub. No murmur heard. Pulmonary/Chest: Effort normal and breath sounds normal. No respiratory distress. Musculoskeletal: He exhibits no edema. Lymphadenopathy:  
  He has no cervical adenopathy. Neurological: He is alert and oriented to person, place, and time. Skin: Skin is warm and dry. Psychiatric: He has a normal mood and affect. His behavior is normal. Judgment and thought content normal.  
Nursing note and vitals reviewed. Recent Results (from the past 12 hour(s)) AMB POC RAPID STREP A Collection Time: 07/30/18  2:35 PM  
Result Value Ref Range VALID INTERNAL CONTROL POC Yes Group A Strep Ag Negative Negative ASSESSMENT and PLAN 
  ICD-10-CM ICD-9-CM 1. Viral URI J06.9 465.9 AMB POC RAPID STREP A  
2. Essential hypertension with goal blood pressure less than 140/90 I10 401.9 3. Idiopathic gout involving toe of right foot, unspecified chronicity M10.071 274.9 4. Herpes labialis B00.1 054.9 valACYclovir (VALTREX) 1 gram tablet Follow-up Disposition: 
Return in about 6 months (around 1/30/2019). the following changes in treatment are made: Viscous lidocaine prn. Refill Valtrex. Continue excellent lifestyle changes. lab results and schedule of future lab studies reviewed with patient 
reviewed medications and side effects in detail Plan of care reviewed - patient verbalize(s) understanding and agreement.

## 2018-07-30 NOTE — PROGRESS NOTES
Chief Complaint Patient presents with  Sore Throat Pt preferred language for health care discussion is English. Is someone accompanying this pt? wife Is the patient using any DME equipment during OV? no 
 
Pt currently taking Antiplatelet therapy? no 
 
Depression Screening: PHQ over the last two weeks 7/30/2018 10/31/2017 6/20/2017 Little interest or pleasure in doing things Not at all Not at all Not at all Feeling down, depressed, irritable, or hopeless Not at all Not at all Not at all Total Score PHQ 2 0 0 0 Learning Assessment: 
Learning Assessment 7/1/2016 PRIMARY LEARNER Patient HIGHEST LEVEL OF EDUCATION - PRIMARY LEARNER  GRADUATED HIGH SCHOOL OR GED  
BARRIERS PRIMARY LEARNER NONE PRIMARY LANGUAGE ENGLISH  
LEARNER PREFERENCE PRIMARY LISTENING  
ANSWERED BY patient RELATIONSHIP SELF Abuse Screening: 
Abuse Screening Questionnaire 2/12/2018 Do you ever feel afraid of your partner? Edgard Poles Are you in a relationship with someone who physically or mentally threatens you? [place] Poles Is it safe for you to go home? Shaina Sanders Health Maintenance reviewed, discussed with patient and ordered per Provider. Health Maintenance Due Topic Date Due  
 DTaP/Tdap/Td series (1 - Tdap) 02/25/2004 Any and all required EMMANUEL forms filled out by patient and faxed, confirmation received. Coordination of Care: 1. Have you been to the ER, urgent care clinic since your last visit? Hospitalized in the last 30 days? no 
 
2. Have you seen or consulted any other health care providers outside of the [address] [place] [address] in the last 30 days? Include any pap smears or colon screening.  no

## 2018-07-30 NOTE — MR AVS SNAPSHOT
[facility] 
 
 
 [address] [facility] [address] [facility] [address] 
[phone] Patient: Talat Staley MRN: [id number] [place]:3/62/7144 Visit Information Date & Time Provider Department Dept. Phone Encounter #  
 7/30/2018  2:15 PM Holly Andersen MD [facility] [id number] [id number] [phone] [id number] Follow-up Instructions Return in about 6 months (around 1/30/2019). Your Appointments 8/6/2018  2:45 PM  
New Patient with Jami Odom MD  
[address] [place] [address], [address] [facility] - [address] [facility]) Appt Note: right wrist pain/ right hand pain/ no recent xrays/ *Advised the patient to come 30 minutes prior to their appointment with their picture I.D, Insurance cards and a list of their medications & dosage to the [employer] [address] location [address] [place] [address], [address] [facility] [address] [phone]  
  
   
 [address] [place] [address], [address] [place] [address] Upcoming Health Maintenance Date Due DTaP/Tdap/Td series (1 - Tdap) 2/25/2004 Influenza Age 5 to Adult 8/1/2018 Allergies as of 7/30/2018  Review Complete On: 7/30/2018 By: Holly Andersen MD  
 No Known Allergies Current Immunizations  Reviewed on 2/12/2018 Name Date Influenza Vaccine 2/12/2018 Influenza Vaccine (Quad) PF 2/12/2018  1:57 PM  
  
 Not reviewed this visit You Were Diagnosed With   
  
 Codes Comments Viral URI    -  Primary ICD-10-CM: J06.9 ICD-9-CM: 465.9 Essential hypertension with goal blood pressure less than 140/90     ICD-10-CM: I10 
ICD-9-CM: 401.9 Idiopathic gout involving toe of right foot, unspecified chronicity     ICD-10-CM: M10.071 ICD-9-CM: 274.9 Herpes labialis     ICD-10-CM: B00.1 ICD-9-CM: 054.9 Vitals BP Pulse Temp Resp Height(growth percentile) Weight(growth percentile) 119/77 73 97 °F (36.1 °C) (Oral) 18 5' 11\" (1.803 m) 206 lb 9.6 oz (93.7 kg) SpO2 BMI Smoking Status 99% 28.81 kg/m2 Never Smoker BMI and BSA Data Body Mass Index Body Surface Area  
 28.81 kg/m 2 2.17 m 2 Preferred Pharmacy Pharmacy Name Phone [facility] [address] [facility] [address] [phone] Your Updated Medication List  
  
   
This list is accurate as of 7/30/18  2:55 PM.  Always use your most recent med list.  
  
  
  
  
 allopurinol 100 mg tablet Commonly known as:  ZYLOPRIM  
TAKE 1 TABLET BY MOUTH DAILY FOR GOUT  
  
 lidocaine 2 % solution Commonly known as:  XYLOCAINE Take 15 mL by mouth every four (4) hours as needed for Pain. Indications: MOUTH IRRITATION  
  
 lisinopril 10 mg tablet Commonly known as:  [place] Take 1 Tab by mouth daily. Indications: hypertension  
  
 meloxicam 15 mg tablet Commonly known as:  MOBIC  
TAKE 1 TABLET BY MOUTH DAILY FOR GOUT  
  
 valACYclovir 1 gram tablet Commonly known as:  VALTREX Take 2 Tabs by mouth two (2) times a day. Indications: HERPES LABIALIS Prescriptions Printed Refills  
 valACYclovir (VALTREX) 1 gram tablet 5 Sig: Take 2 Tabs by mouth two (2) times a day. Indications: HERPES LABIALIS Class: Print Route: Oral  
  
Prescriptions Sent to Pharmacy Refills  
 lidocaine (XYLOCAINE) 2 % solution 0 Sig: Take 15 mL by mouth every four (4) hours as needed for Pain. Indications: MOUTH IRRITATION Class: Normal  
 Pharmacy: [facility]  [address],[place] Ph #: [phone] Route: Oral  
  
We Performed the Following AMB POC RAPID STREP A [61811 CPT(R)] Follow-up Instructions Return in about 6 months (around 1/30/2019). Introducing [facility]!    
 [facility] introduces [employer] patient portal. Now you can access parts of your medical record, email your doctor's office, and request medication refills online. 1. In your internet browser, go to [email or web address] 2. Click on the First Time User? Click Here link in the Sign In box. You will see the New Member Sign Up page. 3. Enter your [employer] Access Code exactly as it appears below. You will not need to use this code after youve completed the sign-up process. If you do not sign up before the expiration date, you must request a new code. · [employer] Access Code: [email or web address] Expires: 10/28/2018  2:55 PM 
 
4. Enter the last four digits of your Social Security Number (xxxx) and Date of Birth (mm/dd/yyyy) as indicated and click Submit. You will be taken to the next sign-up page. 5. Create a [employer] ID. This will be your [employer] login ID and cannot be changed, so think of one that is secure and easy to remember. 6. Create a [employer] password. You can change your password at any time. 7. Enter your Password Reset Question and Answer. This can be used at a later time if you forget your password. 8. Enter your e-mail address. You will receive e-mail notification when new information is available in [address] 9. Click Sign Up. You can now view and download portions of your medical record. 10. Click the Download Summary menu link to download a portable copy of your medical information. If you have questions, please visit the Frequently Asked Questions section of the [employer] website. Remember, [employer] is NOT to be used for urgent needs. For medical emergencies, dial 911. Now available from your iPhone and Android! Please provide this summary of care documentation to your next provider. Your primary care clinician is listed as Nahomy Junoir. If you have any questions after today's visit, please call [phone].

## 2018-07-30 NOTE — LETTER
7/30/2018 2:53 PM 
 
Mr. Mcghee August [address] [facility] [id number] Mr. Gerry Dhaliwal was seen in the office today. He should be excused from yesterday, and may return to work on Wednesday, August 1. Sincerely, Denisse Dodd MD

## 2018-08-21 ENCOUNTER — OFFICE VISIT (OUTPATIENT)
Dept: ORTHOPEDIC SURGERY | Facility: CLINIC | Age: 33
End: 2018-08-21

## 2018-08-21 VITALS
RESPIRATION RATE: 16 BRPM | OXYGEN SATURATION: 98 % | WEIGHT: 205 LBS | HEIGHT: 71 IN | HEART RATE: 77 BPM | BODY MASS INDEX: 28.7 KG/M2 | SYSTOLIC BLOOD PRESSURE: 118 MMHG | TEMPERATURE: 96 F | DIASTOLIC BLOOD PRESSURE: 81 MMHG

## 2018-08-21 DIAGNOSIS — M79.641 RIGHT HAND PAIN: Primary | ICD-10-CM

## 2018-08-21 NOTE — PROGRESS NOTES
Patient: Gonzalo Pope                MRN: [id number]       SSN: [id number]  YOB: 1985        AGE: 26 y.o. SEX: male  Body mass index is 28.59 kg/(m^2). PCP: Jabier Herr MD  08/21/18    Chief Complaint: Right wrist/hand pain    HISTORY OF PRESENT ILLNESS:  Don Winkler is a 43-year-old male who comes in to the office today with a distant history of a right hand injury. He says possibly eight years ago, he had an injury to his right hand where he said he messed it up.   Since that time, he has had pain making a fist.  He has also had pain over the ulnar styloid. He also notes a click whenever he goes into ulnar deviation and flexion of his wrist.  This is over the ulnar styloid. This is painful. He was told at the time of the injury that he needed surgery. He never had it, and now he comes in to the office today for an opinion about surgery. Past Medical History:   Diagnosis Date    Gout     Hypertension        Family History   Problem Relation Age of Onset    Heart Disease Mother     Heart Disease Maternal Grandfather     Stroke Maternal Grandfather     Hypertension Maternal Grandfather     Breast Cancer Paternal Grandmother     Heart Attack Paternal Grandfather        Current Outpatient Prescriptions   Medication Sig Dispense Refill    valACYclovir (VALTREX) 1 gram tablet Take 2 Tabs by mouth two (2) times a day. Indications: HERPES LABIALIS 4 Tab 5    lidocaine (XYLOCAINE) 2 % solution Take 15 mL by mouth every four (4) hours as needed for Pain. Indications: MOUTH IRRITATION 1 Bottle 0    meloxicam (MOBIC) 15 mg tablet TAKE 1 TABLET BY MOUTH DAILY FOR GOUT 90 Tab 3    lisinopril (PRINIVIL, ZESTRIL) 10 mg tablet Take 1 Tab by mouth daily. Indications: hypertension 90 Tab 3    allopurinol (ZYLOPRIM) 100 mg tablet TAKE 1 TABLET BY MOUTH DAILY FOR GOUT 90 Tab 3       No Known Allergies    No past surgical history on file.     Social History     Social History    Marital status:      Spouse name: N/A    Number of children: N/A    Years of education: N/A     Occupational History    Not on file. Social History Main Topics    Smoking status: Never Smoker    Smokeless tobacco: Never Used    Alcohol use No    Drug use: No    Sexual activity: Yes     Partners: Female     Other Topics Concern    Not on file     Social History Narrative       REVIEW OF SYSTEMS:      CON: negative for recent weight loss/gain, fever, or chills  EYE: negative for double or blurry vision  ENT: negative for hoarseness  RS:   negative for cough, URI, SOB  CV:  negative for chest pain, palpitations  GI:    negative for blood in stool, nausea/vomiting  :  negative for blood in urine  MS: As per HPI  Other systems reviewed and noted below. PHYSICAL EXAMINATION:  Visit Vitals    /81    Pulse 77    Temp 96 °F (35.6 °C) (Oral)    Resp 16    Ht 5' 11\" (1.803 m)    Wt 205 lb (93 kg)    SpO2 98%    BMI 28.59 kg/m2     Body mass index is 28.59 kg/(m^2). GENERAL: Alert and oriented x3, in no acute distress, well-developed, well-nourished. HEENT: Normocephalic, atraumatic. RESP: Non labored breathing with equal chest rise on inspiration. CV: Well perfused extremities. No cyanosis or clubbing noted. ABDOMEN: Soft, non-tender, non-distended. PHYSICAL EXAMINATION:   Physical exam of the right hand reveals no obvious deformity. When he makes a fist, he has a slight dimple around his fifth metacarpal MCP joint. This is slightly tender to palpation. He also has a mildly positive Tinels in this area that sends pain into his small finger. He has no deformity. He has 5/5  strength. His sensation is intact to light touch in the radial, ulnar, and median nerve distribution. About the wrist, he has mild tenderness to palpation over the extensor carpi ulnaris tendon. He also has slight crepitus with wrist range of motion that is reproducible over the ECU tendon.   No other deformity is noted about the wrist, and he has full wrist range of motion. IMAGING:  X-rays of the right hand, including the wrist, were taken in the office today. They do not show any bony abnormalities, either acute or chronic that I can appreciate on todays imaging. ASSESSMENT/PLAN:  Gerry Tinajero is a 28-year-old male with some mild right hand and wrist pain. I do think he has some mild ECU tendinitis. He does not have any obvious deformity of his bones from the previous injury. No angulation is appreciated. I personally do not think he needs any surgery on his hand. He may benefit from some treatment of his ECU tendon at the wrist.  Because of this, I recommended he see a hand surgeon for further evaluation and treatment.             Electronically signed by: Belen Carter MD

## 2018-09-25 RX ORDER — LIDOCAINE HYDROCHLORIDE 20 MG/ML
SOLUTION ORAL; TOPICAL
Qty: 100 ML | Refills: 0 | Status: SHIPPED | OUTPATIENT
Start: 2018-09-25 | End: 2018-11-02

## 2018-11-02 ENCOUNTER — OFFICE VISIT (OUTPATIENT)
Dept: FAMILY MEDICINE CLINIC | Facility: CLINIC | Age: 33
End: 2018-11-02

## 2018-11-02 VITALS
DIASTOLIC BLOOD PRESSURE: 88 MMHG | HEART RATE: 90 BPM | BODY MASS INDEX: 27.44 KG/M2 | OXYGEN SATURATION: 96 % | TEMPERATURE: 96.7 F | SYSTOLIC BLOOD PRESSURE: 127 MMHG | HEIGHT: 71 IN | WEIGHT: 196 LBS | RESPIRATION RATE: 18 BRPM

## 2018-11-02 DIAGNOSIS — K11.8 MASS OF PAROTID GLAND: Primary | ICD-10-CM

## 2018-11-02 DIAGNOSIS — J06.9 VIRAL URI WITH COUGH: ICD-10-CM

## 2018-11-02 DIAGNOSIS — Z23 ENCOUNTER FOR IMMUNIZATION: ICD-10-CM

## 2018-11-02 DIAGNOSIS — G47.00 INSOMNIA, UNSPECIFIED TYPE: ICD-10-CM

## 2018-11-02 RX ORDER — VALACYCLOVIR HYDROCHLORIDE 1 G/1
2000 TABLET, FILM COATED ORAL 2 TIMES DAILY
Qty: 4 TAB | Refills: 5 | Status: CANCELLED | OUTPATIENT
Start: 2018-11-02

## 2018-11-02 NOTE — PROGRESS NOTES
Chief Complaint Patient presents with  Mass  
  mass on jawline left jaw referral to infectious disease flu shot

## 2018-11-02 NOTE — PATIENT INSTRUCTIONS
Vaccine Information Statement Influenza (Flu) Vaccine (Inactivated or Recombinant): What you need to know Many Vaccine Information Statements are available in Azeri and other languages. See www.immunize.org/vis Hojas de Información Sobre Vacunas están disponibles en Español y en muchos otros idiomas. Visite www.immunize.org/vis 1. Why get vaccinated? Influenza (flu) is a contagious disease that spreads around the [place] every year, usually between October and May. Flu is caused by influenza viruses, and is spread mainly by coughing, sneezing, and close contact. Anyone can get flu. Flu strikes suddenly and can last several days. Symptoms vary by age, but can include: 
 fever/chills  sore throat  muscle aches  fatigue  cough  headache  runny or stuffy nose Flu can also lead to pneumonia and blood infections, and cause diarrhea and seizures in children. If you have a medical condition, such as heart or lung disease, flu can make it worse. Flu is more dangerous for some people. Infants and young children, people 72years of age and older, pregnant women, and people with certain health conditions or a weakened immune system are at greatest risk. Each year thousands of people in the [facility] die from flu, and many more are hospitalized. Flu vaccine can: 
 keep you from getting flu, 
 make flu less severe if you do get it, and 
 keep you from spreading flu to your family and other people. 2. Inactivated and recombinant flu vaccines A dose of flu vaccine is recommended every flu season. Children 6 months through 6years of age may need two doses during the same flu season. Everyone else needs only one dose each flu season.   
 
 
Some inactivated flu vaccines contain a very small amount of a mercury-based preservative called thimerosal. Studies have not shown thimerosal in vaccines to be harmful, but flu vaccines that do not contain thimerosal are available. There is no live flu virus in flu shots. They cannot cause the flu. There are many flu viruses, and they are always changing. Each year a new flu vaccine is made to protect against three or four viruses that are likely to cause disease in the upcoming flu season. But even when the vaccine doesnt exactly match these viruses, it may still provide some protection Flu vaccine cannot prevent: 
 flu that is caused by a virus not covered by the vaccine, or 
 illnesses that look like flu but are not. It takes about 2 weeks for protection to develop after vaccination, and protection lasts through the flu season. 3. Some people should not get this vaccine Tell the person who is giving you the vaccine:  If you have any severe, life-threatening allergies. If you ever had a life-threatening allergic reaction after a dose of flu vaccine, or have a severe allergy to any part of this vaccine, you may be advised not to get vaccinated. Most, but not all, types of flu vaccine contain a small amount of egg protein.  If you ever had Guillain-Barré Syndrome (also called GBS). Some people with a history of GBS should not get this vaccine. This should be discussed with your doctor.  If you are not feeling well. It is usually okay to get flu vaccine when you have a mild illness, but you might be asked to come back when you feel better. 4. Risks of a vaccine reaction With any medicine, including vaccines, there is a chance of reactions. These are usually mild and go away on their own, but serious reactions are also possible. Most people who get a flu shot do not have any problems with it. Minor problems following a flu shot include:  
 soreness, redness, or swelling where the shot was given  hoarseness  sore, red or itchy eyes  cough  fever  aches  headache  itching  fatigue If these problems occur, they usually begin soon after the shot and last 1 or 2 days. More serious problems following a flu shot can include the following:  There may be a small increased risk of Guillain-Barré Syndrome (GBS) after inactivated flu vaccine. This risk has been estimated at 1 or 2 additional cases per million people vaccinated. This is much lower than the risk of severe complications from flu, which can be prevented by flu vaccine.  Young children who get the flu shot along with pneumococcal vaccine (PCV13) and/or DTaP vaccine at the same time might be slightly more likely to have a seizure caused by fever. Ask your doctor for more information. Tell your doctor if a child who is getting flu vaccine has ever had a seizure. Problems that could happen after any injected vaccine:  People sometimes faint after a medical procedure, including vaccination. Sitting or lying down for about 15 minutes can help prevent fainting, and injuries caused by a fall. Tell your doctor if you feel dizzy, or have vision changes or ringing in the ears.  Some people get severe pain in the shoulder and have difficulty moving the arm where a shot was given. This happens very rarely.  Any medication can cause a severe allergic reaction. Such reactions from a vaccine are very rare, estimated at about 1 in a million doses, and would happen within a few minutes to a few hours after the vaccination. As with any medicine, there is a very remote chance of a vaccine causing a serious injury or death. The safety of vaccines is always being monitored. For more information, visit: www.cdc.gov/vaccinesafety/ 
 
5. What if there is a serious reaction? What should I look for?  Look for anything that concerns you, such as signs of a severe allergic reaction, very high fever, or unusual behavior.  
 
Signs of a severe allergic reaction can include hives, swelling of the face and throat, difficulty breathing, a fast heartbeat, dizziness, and weakness  usually within a few minutes to a few hours after the vaccination. What should I do?  If you think it is a severe allergic reaction or other emergency that cant wait, call 9-1-1 and get the person to the nearest hospital. Otherwise, call your doctor.  Reactions should be reported to the Vaccine Adverse Event Reporting System (VAERS). Your doctor should file this report, or you can do it yourself through  the VAERS web site at www.vaers. [facility].gov, or by calling 6-858.279.6246. VAERS does not give medical advice. 6. The National Vaccine Injury Compensation Program 
 
The [facility] Vaccine Injury Compensation Program (VICP) is a federal program that was created to compensate people who may have been injured by certain vaccines. Persons who believe they may have been injured by a vaccine can learn about the program and about filing a claim by calling 3-660.788.3004 or visiting the [facility] website at www.[facility].gov/vaccinecompensation. There is a time limit to file a claim for compensation. 7. How can I learn more?  Ask your healthcare provider. He or she can give you the vaccine package insert or suggest other sources of information.  Call your local or state health department.  Contact the Centers for Disease Control and Prevention (CDC): 
- Call 3-586.643.1944 (1-800-CDC-INFO) or 
- Visit CDCs website at www.cdc.gov/flu Vaccine Information Statement Inactivated Influenza Vaccine 8/7/2015 
[address] [id number] [facility] [employer] Centers for Disease Control and Prevention Office Use Only Insomnia: Care Instructions Your Care Instructions Insomnia is the inability to sleep well. It is a common problem for most people at some time. Insomnia may make it hard for you to get to sleep, stay asleep, or sleep as long as you need to.  This can make you tired and grouchy during the day. It can also make you forgetful, less effective at work, and unhappy. Insomnia can be caused by conditions such as depression or anxiety. Pain can also affect your ability to sleep. When these problems are solved, the insomnia usually clears up. But sometimes bad sleep habits can cause insomnia. If insomnia is affecting your work or your enjoyment of life, you can take steps to improve your sleep. Follow-up care is a key part of your treatment and safety. Be sure to make and go to all appointments, and call your doctor if you are having problems. It's also a good idea to know your test results and keep a list of the medicines you take. How can you care for yourself at home? What to avoid · Do not have drinks with caffeine, such as coffee or black tea, for 8 hours before bed. · Do not smoke or use other types of tobacco near bedtime. Nicotine is a stimulant and can keep you awake. · Avoid drinking alcohol late in the evening, because it can cause you to wake in the middle of the night. · Do not eat a big meal close to bedtime. If you are hungry, eat a light snack. · Do not drink a lot of water close to bedtime, because the need to urinate may wake you up during the night. · Do not read or watch TV in bed. Use the bed only for sleeping and sexual activity. What to try · Go to bed at the same time every night, and wake up at the same time every morning. Do not take naps during the day. · Keep your bedroom quiet, dark, and cool. · Sleep on a comfortable pillow and mattress. · If watching the clock makes you anxious, turn it facing away from you so you cannot see the time. · If you worry when you lie down, start a worry book. Well before bedtime, write down your worries, and then set the book and your concerns aside. · Try meditation or other relaxation techniques before you go to bed.  
· If you cannot fall asleep, get up and go to another room until you feel sleepy. Do something relaxing. Repeat your bedtime routine before you go to bed again. · Make your house quiet and calm about an hour before bedtime. Turn down the lights, turn off the TV, log off the computer, and turn down the volume on music. This can help you relax after a busy day. When should you call for help? Watch closely for changes in your health, and be sure to contact your doctor if: 
  · Your efforts to improve your sleep do not work.  
  · Your insomnia gets worse.  
  · You have been feeling down, depressed, or hopeless or have lost interest in things that you usually enjoy. Where can you learn more? Go to http://[employer] Technologiesparesh.info/. Enter P513 in the search box to learn more about \"Insomnia: Care Instructions. \" Current as of: June 29, 2018 Content Version: 11.8 © 3115-0226 [employer]. Care instructions adapted under license by [employer] (which disclaims liability or warranty for this information). If you have questions about a medical condition or this instruction, always ask your healthcare professional. [address] any warranty or liability for your use of this information. Learning About Sleeping Well What does sleeping well mean? Sleeping well means getting enough sleep. How much sleep is enough varies among people. The number of hours you sleep is not as important as how you feel when you wake up. If you do not feel refreshed, you probably need more sleep. Another sign of not getting enough sleep is feeling tired during the day. The average total nightly sleep time is 7½ to 8 hours. Healthy adults may need a little more or a little less than this. Why is getting enough sleep important? Getting enough quality sleep is a basic part of good health. When your sleep suffers, your mood and your thoughts can suffer too. You may find yourself feeling more grumpy or stressed.  Not getting enough sleep also can lead to serious problems, including injury, accidents, anxiety, and depression. What might cause poor sleeping? Many things can cause sleep problems, including: · Stress. Stress can be caused by fear about a single event, such as giving a speech. Or you may have ongoing stress, such as worry about work or school. · Depression, anxiety, and other mental or emotional conditions. · Changes in your sleep habits or surroundings. This includes changes that happen where you sleep, such as noise, light, or sleeping in a different bed. It also includes changes in your sleep pattern, such as having jet lag or working a late shift. · Health problems, such as pain, breathing problems, and restless legs syndrome. · Lack of regular exercise. How can you help yourself? Here are some tips that may help you sleep more soundly and wake up feeling more refreshed. Your sleeping area · Use your bedroom only for sleeping and sex. A bit of light reading may help you fall asleep. But if it doesn't, do your reading elsewhere in the house. Don't watch TV in bed. · Be sure your bed is big enough to stretch out comfortably, especially if you have a sleep partner. · Keep your bedroom quiet, dark, and cool. Use curtains, blinds, or a sleep mask to block out light. To block out noise, use earplugs, soothing music, or a \"white noise\" machine. Your evening and bedtime routine · Create a relaxing bedtime routine. You might want to take a warm shower or bath, listen to soothing music, or drink a cup of noncaffeinated tea. · Go to bed at the same time every night. And get up at the same time every morning, even if you feel tired. What to avoid · Limit caffeine (coffee, tea, caffeinated sodas) during the day, and don't have any for at least 4 to 6 hours before bedtime. · Don't drink alcohol before bedtime. Alcohol can cause you to wake up more often during the night. · Don't smoke or use tobacco, especially in the evening. Nicotine can keep you awake. · Don't take naps during the day, especially close to bedtime. · Don't lie in bed awake for too long. If you can't fall asleep, or if you wake up in the middle of the night and can't get back to sleep within 15 minutes or so, get out of bed and go to another room until you feel sleepy. · Don't take medicine right before bed that may keep you awake or make you feel hyper or energized. Your doctor can tell you if your medicine may do this and if you can take it earlier in the day. If you can't sleep · Imagine yourself in a peaceful, pleasant scene. Focus on the details and feelings of being in a place that is relaxing. · Get up and do a quiet or boring activity until you feel sleepy. · Don't drink any liquids after 6 p.m. if you wake up often because you have to go to the bathroom. Where can you learn more? Go to http://gregorio-paresh.info/. Enter L549 in the search box to learn more about \"Learning About Sleeping Well. \" Current as of: December 7, 2017 Content Version: 11.8 © 0643-1577 [employer]. Care instructions adapted under license by [employer] (which disclaims liability or warranty for this information). If you have questions about a medical condition or this instruction, always ask your healthcare professional. [place] [address] any warranty or liability for your use of this information.

## 2018-11-02 NOTE — PROGRESS NOTES
HISTORY OF PRESENT ILLNESS Surinder Munoz is a 26 y.o. male. Presents with a lump over the angle of his jaw on the left side for at least a month. No pain or tenderness - might be slightly larger now. No local trauma. Also concerned about dry cough, nasal congestion, scratchy sore throat for about 3 days, without fever. No ill contacts. Viscous lidocaine didn't help. Also notes some trouble getting to sleep at times. Review of Systems Constitutional: Negative for chills and fever. HENT: Positive for congestion and sore throat. Negative for ear pain. Respiratory: Positive for cough. Negative for sputum production and shortness of breath. Cardiovascular: Negative for chest pain. Gastrointestinal: Negative for nausea and vomiting. Musculoskeletal: Positive for myalgias. Neurological: Negative for headaches. Visit Vitals /88 Pulse 90 Temp 96.7 °F (35.9 °C) (Oral) Resp 18 Ht 5' 11\" (1.803 m) Wt 196 lb (88.9 kg) SpO2 96% BMI 27.34 kg/m² Physical Exam  
Constitutional: He appears well-developed and well-nourished. No distress. HENT:  
Right Ear: Tympanic membrane, external ear and ear canal normal.  
Left Ear: Tympanic membrane, external ear and ear canal normal.  
Nose: Mucosal edema present. Mouth/Throat: Oropharynx is clear and moist.  
3cm slightly mobile mass in left parotid gland without tenderness Neck: Neck supple. No thyromegaly present. Lymphadenopathy:  
  He has no cervical adenopathy. ASSESSMENT and PLAN 
  ICD-10-CM ICD-9-CM 1. Mass of parotid gland K11.9 784.2 REFERRAL TO ENT-OTOLARYNGOLOGY 2. Viral URI with cough J06.9 465.9 B97.89    
3. Insomnia, unspecified type G47.00 780.52   
4. Encounter for immunization Z23 V03.89 INFLUENZA VIRUS VAC QUAD,SPLIT,PRESV FREE SYRINGE IM Follow-up Disposition: 
Return as planned. current treatment plan is effective, no change in therapy Referral to ENT. Handouts on sleep hygiene. Flu shot today. reviewed medications and side effects in detail Plan of care reviewed - patient verbalize(s) understanding and agreement.

## 2019-02-16 NOTE — ACP (ADVANCE CARE PLANNING)
The patient was advised and counseled regarding advanced directives. The patient was provided with an information packet. no

## 2019-04-25 ENCOUNTER — HOSPITAL ENCOUNTER (OUTPATIENT)
Dept: LAB | Age: 34
Discharge: HOME | End: 2019-04-25
Payer: COMMERCIAL

## 2019-04-25 PROCEDURE — 88173 CYTOPATH EVAL FNA REPORT: CPT

## 2019-06-10 ENCOUNTER — HOSPITAL ENCOUNTER (OUTPATIENT)
Dept: LAB | Age: 34
Discharge: HOME | End: 2019-06-10
Payer: COMMERCIAL

## 2019-06-10 DIAGNOSIS — Z01.818 PRE-OP EVALUATION: ICD-10-CM

## 2019-06-10 LAB
ANION GAP SERPL CALC-SCNC: 4 MMOL/L (ref 3–18)
BASOPHILS # BLD: 0 K/UL (ref 0–0.1)
BASOPHILS NFR BLD: 0 % (ref 0–2)
BUN SERPL-MCNC: 20 MG/DL (ref 7–18)
BUN/CREAT SERPL: 19 (ref 12–20)
CALCIUM SERPL-MCNC: 9.4 MG/DL (ref 8.5–10.1)
CHLORIDE SERPL-SCNC: 105 MMOL/L (ref 100–108)
CO2 SERPL-SCNC: 31 MMOL/L (ref 21–32)
CREAT SERPL-MCNC: 1.04 MG/DL (ref 0.6–1.3)
DIFFERENTIAL METHOD BLD: ABNORMAL
EOSINOPHIL # BLD: 0.1 K/UL (ref 0–0.4)
EOSINOPHIL NFR BLD: 3 % (ref 0–5)
ERYTHROCYTE [DISTWIDTH] IN BLOOD BY AUTOMATED COUNT: 14.7 % (ref 11.6–14.5)
GLUCOSE SERPL-MCNC: 86 MG/DL (ref 74–99)
HCT VFR BLD AUTO: 41.9 % (ref 36–48)
HGB BLD-MCNC: 13.9 G/DL (ref 13–16)
LYMPHOCYTES # BLD: 1.8 K/UL (ref 0.9–3.6)
LYMPHOCYTES NFR BLD: 32 % (ref 21–52)
MCH RBC QN AUTO: 24.9 PG (ref 24–34)
MCHC RBC AUTO-ENTMCNC: 33.2 G/DL (ref 31–37)
MCV RBC AUTO: 75 FL (ref 74–97)
MONOCYTES # BLD: 0.4 K/UL (ref 0.05–1.2)
MONOCYTES NFR BLD: 8 % (ref 3–10)
NEUTS SEG # BLD: 3.1 K/UL (ref 1.8–8)
NEUTS SEG NFR BLD: 57 % (ref 40–73)
PLATELET # BLD AUTO: 228 K/UL (ref 135–420)
PMV BLD AUTO: 8.6 FL (ref 9.2–11.8)
POTASSIUM SERPL-SCNC: 4.5 MMOL/L (ref 3.5–5.5)
RBC # BLD AUTO: 5.59 M/UL (ref 4.7–5.5)
SODIUM SERPL-SCNC: 140 MMOL/L (ref 136–145)
WBC # BLD AUTO: 5.4 K/UL (ref 4.6–13.2)

## 2019-06-10 PROCEDURE — 93005 ELECTROCARDIOGRAM TRACING: CPT

## 2019-06-10 PROCEDURE — 80048 BASIC METABOLIC PNL TOTAL CA: CPT

## 2019-06-10 PROCEDURE — 36415 COLL VENOUS BLD VENIPUNCTURE: CPT

## 2019-06-10 PROCEDURE — 85025 COMPLETE CBC W/AUTO DIFF WBC: CPT

## 2019-06-11 LAB
ATRIAL RATE: 81 BPM
CALCULATED P AXIS, ECG09: 23 DEGREES
CALCULATED R AXIS, ECG10: 25 DEGREES
CALCULATED T AXIS, ECG11: 28 DEGREES
DIAGNOSIS, 93000: NORMAL
P-R INTERVAL, ECG05: 132 MS
Q-T INTERVAL, ECG07: 312 MS
QRS DURATION, ECG06: 96 MS
QTC CALCULATION (BEZET), ECG08: 362 MS
VENTRICULAR RATE, ECG03: 81 BPM

## 2019-06-14 ENCOUNTER — ANESTHESIA EVENT (OUTPATIENT)
Dept: SURGERY | Age: 34
DRG: 139 | End: 2019-06-14
Payer: COMMERCIAL

## 2019-06-17 ENCOUNTER — ANESTHESIA (OUTPATIENT)
Dept: SURGERY | Age: 34
DRG: 139 | End: 2019-06-17
Payer: COMMERCIAL

## 2019-06-17 ENCOUNTER — HOSPITAL ENCOUNTER (INPATIENT)
Age: 34
LOS: 1 days | Discharge: HOME | DRG: 139 | End: 2019-06-18
Attending: OTOLARYNGOLOGY | Admitting: OTOLARYNGOLOGY
Payer: COMMERCIAL

## 2019-06-17 DIAGNOSIS — Z90.49 H/O PAROTIDECTOMY: Primary | ICD-10-CM

## 2019-06-17 PROCEDURE — 88307 TISSUE EXAM BY PATHOLOGIST: CPT

## 2019-06-17 PROCEDURE — 77030008684 HC TU ET CUF COVD -B: Performed by: ANESTHESIOLOGY

## 2019-06-17 PROCEDURE — 74011000250 HC RX REV CODE- 250: Performed by: NURSE ANESTHETIST, CERTIFIED REGISTERED

## 2019-06-17 PROCEDURE — 77030013567 HC DRN WND RESERV BARD -A: Performed by: OTOLARYNGOLOGY

## 2019-06-17 PROCEDURE — 76060000037 HC ANESTHESIA 3 TO 3.5 HR: Performed by: OTOLARYNGOLOGY

## 2019-06-17 PROCEDURE — 74011250636 HC RX REV CODE- 250/636: Performed by: OTOLARYNGOLOGY

## 2019-06-17 PROCEDURE — 74011000250 HC RX REV CODE- 250

## 2019-06-17 PROCEDURE — 65270000029 HC RM PRIVATE

## 2019-06-17 PROCEDURE — 77030027138 HC INCENT SPIROMETER -A

## 2019-06-17 PROCEDURE — 74011250636 HC RX REV CODE- 250/636: Performed by: NURSE ANESTHETIST, CERTIFIED REGISTERED

## 2019-06-17 PROCEDURE — 74011250636 HC RX REV CODE- 250/636

## 2019-06-17 PROCEDURE — 77030002996 HC SUT SLK J&J -A: Performed by: OTOLARYNGOLOGY

## 2019-06-17 PROCEDURE — 77030013079 HC BLNKT BAIR HGGR 3M -A: Performed by: ANESTHESIOLOGY

## 2019-06-17 PROCEDURE — 77030012623 HC STIM NRV HND MEDT -B: Performed by: OTOLARYNGOLOGY

## 2019-06-17 PROCEDURE — 76010000133 HC OR TIME 3 TO 3.5 HR: Performed by: OTOLARYNGOLOGY

## 2019-06-17 PROCEDURE — 77030002916 HC SUT ETHLN J&J -A: Performed by: OTOLARYNGOLOGY

## 2019-06-17 PROCEDURE — 77030011267 HC ELECTRD BLD COVD -A: Performed by: OTOLARYNGOLOGY

## 2019-06-17 PROCEDURE — 77030040356 HC CORD BPLR FRCP COVD -A: Performed by: OTOLARYNGOLOGY

## 2019-06-17 PROCEDURE — 77030002986 HC SUT PROL J&J -A: Performed by: OTOLARYNGOLOGY

## 2019-06-17 PROCEDURE — 77030019908 HC STETH ESOPH SIMS -A: Performed by: ANESTHESIOLOGY

## 2019-06-17 PROCEDURE — 77030032490 HC SLV COMPR SCD KNE COVD -B: Performed by: OTOLARYNGOLOGY

## 2019-06-17 PROCEDURE — 77030012407 HC DRN WND BARD -B: Performed by: OTOLARYNGOLOGY

## 2019-06-17 PROCEDURE — 77030026438 HC STYL ET INTUB CARD -A: Performed by: ANESTHESIOLOGY

## 2019-06-17 PROCEDURE — 77030031139 HC SUT VCRL2 J&J -A: Performed by: OTOLARYNGOLOGY

## 2019-06-17 PROCEDURE — 74011250637 HC RX REV CODE- 250/637: Performed by: OTOLARYNGOLOGY

## 2019-06-17 PROCEDURE — 76210000006 HC OR PH I REC 0.5 TO 1 HR: Performed by: OTOLARYNGOLOGY

## 2019-06-17 PROCEDURE — 77030008467 HC STPLR SKN COVD -B: Performed by: OTOLARYNGOLOGY

## 2019-06-17 PROCEDURE — 77030031753 HC SHR ENDO COAG HARM J&J -E: Performed by: OTOLARYNGOLOGY

## 2019-06-17 RX ORDER — COLCHICINE 0.6 MG/1
0.6 CAPSULE ORAL DAILY
Status: DISCONTINUED | OUTPATIENT
Start: 2019-06-18 | End: 2019-06-18 | Stop reason: HOSPADM

## 2019-06-17 RX ORDER — HYDROMORPHONE HYDROCHLORIDE 2 MG/ML
0.5 INJECTION, SOLUTION INTRAMUSCULAR; INTRAVENOUS; SUBCUTANEOUS
Status: DISCONTINUED | OUTPATIENT
Start: 2019-06-17 | End: 2019-06-17 | Stop reason: HOSPADM

## 2019-06-17 RX ORDER — ROCURONIUM BROMIDE 10 MG/ML
INJECTION, SOLUTION INTRAVENOUS AS NEEDED
Status: DISCONTINUED | OUTPATIENT
Start: 2019-06-17 | End: 2019-06-17 | Stop reason: HOSPADM

## 2019-06-17 RX ORDER — MIDAZOLAM HYDROCHLORIDE 1 MG/ML
INJECTION, SOLUTION INTRAMUSCULAR; INTRAVENOUS AS NEEDED
Status: DISCONTINUED | OUTPATIENT
Start: 2019-06-17 | End: 2019-06-17 | Stop reason: HOSPADM

## 2019-06-17 RX ORDER — ONDANSETRON 2 MG/ML
INJECTION INTRAMUSCULAR; INTRAVENOUS AS NEEDED
Status: DISCONTINUED | OUTPATIENT
Start: 2019-06-17 | End: 2019-06-17 | Stop reason: HOSPADM

## 2019-06-17 RX ORDER — HYDROCODONE BITARTRATE AND ACETAMINOPHEN 5; 325 MG/1; MG/1
1 TABLET ORAL AS NEEDED
Status: DISCONTINUED | OUTPATIENT
Start: 2019-06-17 | End: 2019-06-17 | Stop reason: HOSPADM

## 2019-06-17 RX ORDER — SODIUM CHLORIDE, SODIUM LACTATE, POTASSIUM CHLORIDE, CALCIUM CHLORIDE 600; 310; 30; 20 MG/100ML; MG/100ML; MG/100ML; MG/100ML
75 INJECTION, SOLUTION INTRAVENOUS CONTINUOUS
Status: DISCONTINUED | OUTPATIENT
Start: 2019-06-18 | End: 2019-06-18 | Stop reason: HOSPADM

## 2019-06-17 RX ORDER — SODIUM CHLORIDE 0.9 % (FLUSH) 0.9 %
5-40 SYRINGE (ML) INJECTION EVERY 8 HOURS
Status: DISCONTINUED | OUTPATIENT
Start: 2019-06-17 | End: 2019-06-18 | Stop reason: HOSPADM

## 2019-06-17 RX ORDER — LIDOCAINE HYDROCHLORIDE 20 MG/ML
INJECTION, SOLUTION EPIDURAL; INFILTRATION; INTRACAUDAL; PERINEURAL AS NEEDED
Status: DISCONTINUED | OUTPATIENT
Start: 2019-06-17 | End: 2019-06-17 | Stop reason: HOSPADM

## 2019-06-17 RX ORDER — FENTANYL CITRATE 50 UG/ML
INJECTION, SOLUTION INTRAMUSCULAR; INTRAVENOUS AS NEEDED
Status: DISCONTINUED | OUTPATIENT
Start: 2019-06-17 | End: 2019-06-17 | Stop reason: HOSPADM

## 2019-06-17 RX ORDER — GLYCOPYRROLATE 0.2 MG/ML
INJECTION INTRAMUSCULAR; INTRAVENOUS AS NEEDED
Status: DISCONTINUED | OUTPATIENT
Start: 2019-06-17 | End: 2019-06-17 | Stop reason: HOSPADM

## 2019-06-17 RX ORDER — NALOXONE HYDROCHLORIDE 0.4 MG/ML
0.4 INJECTION, SOLUTION INTRAMUSCULAR; INTRAVENOUS; SUBCUTANEOUS AS NEEDED
Status: DISCONTINUED | OUTPATIENT
Start: 2019-06-17 | End: 2019-06-18 | Stop reason: HOSPADM

## 2019-06-17 RX ORDER — ONDANSETRON 2 MG/ML
4 INJECTION INTRAMUSCULAR; INTRAVENOUS
Status: DISCONTINUED | OUTPATIENT
Start: 2019-06-17 | End: 2019-06-17 | Stop reason: HOSPADM

## 2019-06-17 RX ORDER — HYDROMORPHONE HYDROCHLORIDE 2 MG/ML
INJECTION, SOLUTION INTRAMUSCULAR; INTRAVENOUS; SUBCUTANEOUS AS NEEDED
Status: DISCONTINUED | OUTPATIENT
Start: 2019-06-17 | End: 2019-06-17 | Stop reason: HOSPADM

## 2019-06-17 RX ORDER — SUCCINYLCHOLINE CHLORIDE 20 MG/ML
INJECTION INTRAMUSCULAR; INTRAVENOUS AS NEEDED
Status: DISCONTINUED | OUTPATIENT
Start: 2019-06-17 | End: 2019-06-17 | Stop reason: HOSPADM

## 2019-06-17 RX ORDER — HYDROMORPHONE HYDROCHLORIDE 1 MG/ML
0.5 INJECTION, SOLUTION INTRAMUSCULAR; INTRAVENOUS; SUBCUTANEOUS
Status: DISCONTINUED | OUTPATIENT
Start: 2019-06-17 | End: 2019-06-18 | Stop reason: HOSPADM

## 2019-06-17 RX ORDER — LISINOPRIL 10 MG/1
10 TABLET ORAL DAILY
Status: DISCONTINUED | OUTPATIENT
Start: 2019-06-18 | End: 2019-06-18 | Stop reason: HOSPADM

## 2019-06-17 RX ORDER — SODIUM CHLORIDE, SODIUM LACTATE, POTASSIUM CHLORIDE, CALCIUM CHLORIDE 600; 310; 30; 20 MG/100ML; MG/100ML; MG/100ML; MG/100ML
INJECTION, SOLUTION INTRAVENOUS
Status: DISCONTINUED | OUTPATIENT
Start: 2019-06-17 | End: 2019-06-17 | Stop reason: HOSPADM

## 2019-06-17 RX ORDER — PROPOFOL 10 MG/ML
INJECTION, EMULSION INTRAVENOUS AS NEEDED
Status: DISCONTINUED | OUTPATIENT
Start: 2019-06-17 | End: 2019-06-17 | Stop reason: HOSPADM

## 2019-06-17 RX ORDER — FENTANYL CITRATE 50 UG/ML
25 INJECTION, SOLUTION INTRAMUSCULAR; INTRAVENOUS AS NEEDED
Status: DISCONTINUED | OUTPATIENT
Start: 2019-06-17 | End: 2019-06-17 | Stop reason: HOSPADM

## 2019-06-17 RX ORDER — SODIUM CHLORIDE 0.9 % (FLUSH) 0.9 %
5-40 SYRINGE (ML) INJECTION AS NEEDED
Status: DISCONTINUED | OUTPATIENT
Start: 2019-06-17 | End: 2019-06-18 | Stop reason: HOSPADM

## 2019-06-17 RX ORDER — HYDROCODONE BITARTRATE AND ACETAMINOPHEN 5; 325 MG/1; MG/1
1 TABLET ORAL
Status: DISCONTINUED | OUTPATIENT
Start: 2019-06-17 | End: 2019-06-18 | Stop reason: HOSPADM

## 2019-06-17 RX ORDER — DEXAMETHASONE SODIUM PHOSPHATE 4 MG/ML
INJECTION, SOLUTION INTRA-ARTICULAR; INTRALESIONAL; INTRAMUSCULAR; INTRAVENOUS; SOFT TISSUE AS NEEDED
Status: DISCONTINUED | OUTPATIENT
Start: 2019-06-17 | End: 2019-06-17 | Stop reason: HOSPADM

## 2019-06-17 RX ORDER — SODIUM CHLORIDE, SODIUM LACTATE, POTASSIUM CHLORIDE, CALCIUM CHLORIDE 600; 310; 30; 20 MG/100ML; MG/100ML; MG/100ML; MG/100ML
50 INJECTION, SOLUTION INTRAVENOUS CONTINUOUS
Status: DISCONTINUED | OUTPATIENT
Start: 2019-06-17 | End: 2019-06-17 | Stop reason: HOSPADM

## 2019-06-17 RX ORDER — ONDANSETRON 4 MG/1
4 TABLET, ORALLY DISINTEGRATING ORAL
Status: DISCONTINUED | OUTPATIENT
Start: 2019-06-17 | End: 2019-06-18 | Stop reason: HOSPADM

## 2019-06-17 RX ADMIN — MIDAZOLAM HYDROCHLORIDE 2 MG: 1 INJECTION, SOLUTION INTRAMUSCULAR; INTRAVENOUS at 13:25

## 2019-06-17 RX ADMIN — LIDOCAINE HYDROCHLORIDE 100 MG: 20 INJECTION, SOLUTION EPIDURAL; INFILTRATION; INTRACAUDAL; PERINEURAL at 13:34

## 2019-06-17 RX ADMIN — ONDANSETRON 4 MG: 2 INJECTION INTRAMUSCULAR; INTRAVENOUS at 16:05

## 2019-06-17 RX ADMIN — FENTANYL CITRATE 50 MCG: 50 INJECTION, SOLUTION INTRAMUSCULAR; INTRAVENOUS at 14:00

## 2019-06-17 RX ADMIN — HYDROCODONE BITARTRATE AND ACETAMINOPHEN 1 TABLET: 5; 325 TABLET ORAL at 20:05

## 2019-06-17 RX ADMIN — SUCCINYLCHOLINE CHLORIDE 160 MG: 20 INJECTION INTRAMUSCULAR; INTRAVENOUS at 13:34

## 2019-06-17 RX ADMIN — FENTANYL CITRATE 50 MCG: 50 INJECTION, SOLUTION INTRAMUSCULAR; INTRAVENOUS at 13:44

## 2019-06-17 RX ADMIN — ROCURONIUM BROMIDE 5 MG: 10 INJECTION, SOLUTION INTRAVENOUS at 13:34

## 2019-06-17 RX ADMIN — SODIUM CHLORIDE, SODIUM LACTATE, POTASSIUM CHLORIDE, AND CALCIUM CHLORIDE 75 ML/HR: 600; 310; 30; 20 INJECTION, SOLUTION INTRAVENOUS at 23:58

## 2019-06-17 RX ADMIN — HYDROMORPHONE HYDROCHLORIDE 1 MG: 2 INJECTION, SOLUTION INTRAMUSCULAR; INTRAVENOUS; SUBCUTANEOUS at 15:05

## 2019-06-17 RX ADMIN — HYDROMORPHONE HYDROCHLORIDE 0.5 MG: 2 INJECTION, SOLUTION INTRAMUSCULAR; INTRAVENOUS; SUBCUTANEOUS at 16:12

## 2019-06-17 RX ADMIN — PROPOFOL 150 MG: 10 INJECTION, EMULSION INTRAVENOUS at 13:34

## 2019-06-17 RX ADMIN — HYDROCODONE BITARTRATE AND ACETAMINOPHEN 1 TABLET: 5; 325 TABLET ORAL at 23:56

## 2019-06-17 RX ADMIN — Medication 10 ML: at 23:59

## 2019-06-17 RX ADMIN — DEXAMETHASONE SODIUM PHOSPHATE 4 MG: 4 INJECTION, SOLUTION INTRA-ARTICULAR; INTRALESIONAL; INTRAMUSCULAR; INTRAVENOUS; SOFT TISSUE at 13:58

## 2019-06-17 RX ADMIN — GLYCOPYRROLATE 0.15 MG: 0.2 INJECTION INTRAMUSCULAR; INTRAVENOUS at 13:25

## 2019-06-17 RX ADMIN — FENTANYL CITRATE 50 MCG: 50 INJECTION, SOLUTION INTRAMUSCULAR; INTRAVENOUS at 13:30

## 2019-06-17 RX ADMIN — SODIUM CHLORIDE, SODIUM LACTATE, POTASSIUM CHLORIDE, AND CALCIUM CHLORIDE 75 ML/HR: 600; 310; 30; 20 INJECTION, SOLUTION INTRAVENOUS at 12:20

## 2019-06-17 RX ADMIN — HYDROMORPHONE HYDROCHLORIDE 0.5 MG: 2 INJECTION, SOLUTION INTRAMUSCULAR; INTRAVENOUS; SUBCUTANEOUS at 14:52

## 2019-06-17 RX ADMIN — FENTANYL CITRATE 50 MCG: 50 INJECTION, SOLUTION INTRAMUSCULAR; INTRAVENOUS at 13:25

## 2019-06-17 RX ADMIN — SODIUM CHLORIDE, SODIUM LACTATE, POTASSIUM CHLORIDE, CALCIUM CHLORIDE: 600; 310; 30; 20 INJECTION, SOLUTION INTRAVENOUS at 13:25

## 2019-06-17 RX ADMIN — FAMOTIDINE 20 MG: 10 INJECTION INTRAVENOUS at 12:29

## 2019-06-17 RX ADMIN — SODIUM CHLORIDE, SODIUM LACTATE, POTASSIUM CHLORIDE, AND CALCIUM CHLORIDE 75 ML/HR: 600; 310; 30; 20 INJECTION, SOLUTION INTRAVENOUS at 19:01

## 2019-06-17 RX ADMIN — SODIUM CHLORIDE, SODIUM LACTATE, POTASSIUM CHLORIDE, CALCIUM CHLORIDE: 600; 310; 30; 20 INJECTION, SOLUTION INTRAVENOUS at 14:54

## 2019-06-17 NOTE — H&P
[address] [place] [address]   HISTORY AND PHYSICAL    Name:  Destiny Virgen  MR#:   [id number]  :  1985  ACCOUNT #:  [de-identified]  ADMIT DATE:  2019      REASON FOR EVALUATION:  Left parotid mass. HISTORY OF PRESENT ILLNESS:  The patient is a 41-year-old male who has had a left parotid mass. It has slowly been increasing in size. He has undergone a fine needle aspiration for this. 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 The patient denies any significant pain, denies any weakness, denies any facial nerve paralysis due to the above issue. The patient is now to undergo a left parathyroidectomy. PAST MEDICAL HISTORY:  Significant for hypertension. PAST SURGICAL HISTORY:  Denied. MEDICATION USE:  Lisinopril and meloxicam.    SOCIAL HISTORY:  The patient denies any tobacco use at the present time. REVIEW OF SYSTEMS:  Noncontributory. PHYSICAL EXAMINATION:  GENERAL:  Reveals a well-developed, well-nourished male. VITAL SIGNS:  Height 5 feet 11 inches, weight of 190, blood pressure 107/84, respirations 16, pulse 100. HEENT:  Ear exam reveals normal-appearing tympanic membranes. The oral cavity and oropharynx are within normal limits. The facial exam reveals a left parotid mass. This does appear to be fairly firm. This is approximately 2 cm in size, currently well circumscribed. NECK:  Supple, nontender. No masses felt. CHEST:  Bilaterally clear. HEART:  S1 and S2. No murmur audible. EXTREMITIES:  Within normal limits. NEUROLOGIC:  Grossly intact. IMPRESSION:  Left parotid mass. PLAN:  The patient will undergo left parotidectomy. Risks, benefits, and complications were discussed with the patient who understands and is aware. We will make arrangements for this on 2019.       Nora Melton MD      [id number]  D:  2019 19:45  T:  2019 23:48  JOB #:  [id number]

## 2019-06-17 NOTE — ANESTHESIA POSTPROCEDURE EVALUATION
Procedure(s):  LEFT PAROTIDECTOMY. general    Anesthesia Post Evaluation      Multimodal analgesia: multimodal analgesia used between 6 hours prior to anesthesia start to PACU discharge  Patient location during evaluation: bedside  Patient participation: complete - patient participated  Level of consciousness: awake  Pain management: adequate  Airway patency: patent  Anesthetic complications: no  Cardiovascular status: stable  Respiratory status: acceptable  Hydration status: acceptable  Post anesthesia nausea and vomiting:  controlled      Vitals Value Taken Time   /70 6/17/2019  5:26 PM   Temp 37.2 °C (98.9 °F) 6/17/2019  5:26 PM   Pulse 88 6/17/2019  5:28 PM   Resp 10 6/17/2019  5:28 PM   SpO2 99 % 6/17/2019  5:28 PM   Vitals shown include unvalidated device data.

## 2019-06-17 NOTE — BRIEF OP NOTE
BRIEF OPERATIVE NOTE    Date of Procedure: 6/17/2019   Preoperative Diagnosis: W39.377 NEOPLASM OF UNCERTAIN BEHAVIOR OF PAROTID  Postoperative Diagnosis: M24.463 NEOPLASM OF UNCERTAIN BEHAVIOR OF     Procedure(s):  LEFT PAROTIDECTOMY  Surgeon(s) and Role:     Donald Grover MD - Primary         Surgical Assistant: none*    Surgical Staff:  Circ-1: Mary Eaton  Circ-Relief: Yosvany March RN  Scrub Tech-1: Maxx Coto  Scrub Tech-Relief: Isabel Valenzuela  Surg Asst-1: María Andersen  Surg Asst-Relief: Delfina Woods  Event Time In Time Out   Incision Start 1404    Incision Close       Anesthesia: General   Estimated Blood Loss: *75ml  Specimens: * No specimens in log *   Findings: parotid tumor removed*   Complications:none*  Implants: * No implants in log *

## 2019-06-17 NOTE — PERIOP NOTES
TRANSFER - OUT REPORT:    Verbal report given to Zuhair Win on [employer]  being transferred to  for routine post - op       Report consisted of patients Situation, Background, Assessment and   Recommendations(SBAR). Information from the following report(s) SBAR and MAR was reviewed with the receiving nurse. Lines:   Peripheral IV 06/17/19 Right Hand (Active)   Site Assessment Clean, dry, & intact 6/17/2019  4:36 PM   Phlebitis Assessment 0 6/17/2019  4:36 PM   Infiltration Assessment 0 6/17/2019  4:36 PM   Dressing Status Clean, dry, & intact 6/17/2019  4:36 PM   Dressing Type Transparent 6/17/2019  4:36 PM   Hub Color/Line Status Pink; Infusing 6/17/2019  4:36 PM   Alcohol Cap Used No 6/17/2019 12:24 PM        Opportunity for questions and clarification was provided.       Patient transported with:   O2 @ 2 liters

## 2019-06-17 NOTE — ANESTHESIA PREPROCEDURE EVALUATION
Relevant Problems   No relevant active problems       Anesthetic History   No history of anesthetic complications            Review of Systems / Medical History  Patient summary reviewed and pertinent labs reviewed    Pulmonary  Within defined limits                 Neuro/Psych   Within defined limits           Cardiovascular    Hypertension: well controlled              Exercise tolerance: >4 METS     GI/Hepatic/Renal                Endo/Other        Arthritis     Other Findings   Comments: Left parotid mass-pleomorphic adenoma         Physical Exam    Airway  Mallampati: II  TM Distance: 4 - 6 cm  Neck ROM: normal range of motion   Mouth opening: Normal     Cardiovascular  Regular rate and rhythm,  S1 and S2 normal,  no murmur, click, rub, or gallop             Dental  No notable dental hx       Pulmonary  Breath sounds clear to auscultation               Abdominal  GI exam deferred       Other Findings            Anesthetic Plan    ASA: 2  Anesthesia type: general          Induction: Intravenous  Anesthetic plan and risks discussed with: Patient

## 2019-06-18 VITALS
DIASTOLIC BLOOD PRESSURE: 86 MMHG | WEIGHT: 197.38 LBS | TEMPERATURE: 97.3 F | OXYGEN SATURATION: 100 % | RESPIRATION RATE: 20 BRPM | BODY MASS INDEX: 28.26 KG/M2 | HEIGHT: 70 IN | HEART RATE: 86 BPM | SYSTOLIC BLOOD PRESSURE: 126 MMHG

## 2019-06-18 PROCEDURE — 74011250637 HC RX REV CODE- 250/637: Performed by: OTOLARYNGOLOGY

## 2019-06-18 PROCEDURE — 0CT90ZZ RESECTION OF LEFT PAROTID GLAND, OPEN APPROACH: ICD-10-PCS | Performed by: OTOLARYNGOLOGY

## 2019-06-18 RX ORDER — HYDROCODONE BITARTRATE AND ACETAMINOPHEN 5; 325 MG/1; MG/1
1 TABLET ORAL
Qty: 25 TAB | Refills: 0 | Status: SHIPPED | OUTPATIENT
Start: 2019-06-18 | End: 2019-06-23

## 2019-06-18 RX ORDER — CEPHALEXIN 500 MG/1
500 CAPSULE ORAL 3 TIMES DAILY
Qty: 21 CAP | Refills: 0 | Status: SHIPPED | OUTPATIENT
Start: 2019-06-18 | End: 2019-06-25

## 2019-06-18 RX ADMIN — HYDROCODONE BITARTRATE AND ACETAMINOPHEN 1 TABLET: 5; 325 TABLET ORAL at 09:01

## 2019-06-18 RX ADMIN — COLCHICINE 0.6 MG: 0.6 CAPSULE ORAL at 09:01

## 2019-06-18 RX ADMIN — LISINOPRIL 10 MG: 10 TABLET ORAL at 09:01

## 2019-06-18 RX ADMIN — Medication 10 ML: at 06:00

## 2019-06-18 NOTE — PROGRESS NOTES
Discharge planning    Discharge order noted for today. Met with patient and agreeable to the transition plan today. No needs identified from CM. Transport has been arranged with brother. Updated bedside RN, Alexus Washington,  to the transition plan.   Discharge information has been documented on the AVS.     REINA Wagner, RN  Pager # [phone]  Care Manager

## 2019-06-18 NOTE — PROGRESS NOTES
Reason for Admission:  H/O parotidectomy [Z90.49]                 RRAT Score:  0           Plan for utilizing home health:    None anticipated. Likelihood of Readmission:   LOW                         Transition of Care Plan:              Initial assessment completed with patient. Cognitive status of patient: oriented to time, place, person and situation. Face sheet information confirmed:  yes. The patient designates Rosibel Cárdenas, spouse to participate in his discharge plan and to receive any needed information. This patient lives in a single family home with spouse. Patient is able to navigate steps as needed. Prior to hospitalization, patient was considered to be independent with ADLs/IADLS : yes . Patient has a current ACP document on file: no     The patient's brother will be available to transport patient home upon discharge. The patient  has no medical equipment available in the home. Patient is not currently active with home health. Patient has not stayed in a skilled nursing facility or rehab. This patient is on dialysis :no       Freedom of choice signed: no      Currently, the discharge plan is Home. CM will continue to monitor for transitional needs. The patient states that he can obtain his medications from the pharmacy, and take his medications as directed. Patient's current insurance is [employer]       Care Management Interventions  PCP Verified by CM: Yes(Per pt, saw pcp 6 months ago.)  Mode of Transport at Discharge: Self  Transition of Care Consult (CM Consult): Discharge Planning  MyChart Signup: No  Discharge Durable Medical Equipment: No  Physical Therapy Consult: No  Occupational Therapy Consult: No  Speech Therapy Consult: No  Current Support Network: Lives with Spouse  Confirm Follow Up Transport: Self  Plan discussed with Pt/Family/Caregiver: Yes  Discharge Location  Discharge Placement: Home      EMERALD ZuluagaN, RN  Pager # [phone]  Care Manager

## 2019-06-18 NOTE — ROUTINE PROCESS
Pt given discharge instructions. Pt verbalized understanding. .  Each discharge page verified that it matched pts name. IV d/cd no signs of infection noted. Armbands removed and shredded. Pt instructed on how to empty drain and record drainage amount. Pt stable.

## 2019-06-18 NOTE — DISCHARGE INSTRUCTIONS
DISCHARGE SUMMARY from Nurse    PATIENT INSTRUCTIONS:    After general anesthesia or intravenous sedation, for 24 hours or while taking prescription Narcotics:  · Limit your activities  · Do not drive and operate hazardous machinery  · Do not make important personal or business decisions  · Do  not drink alcoholic beverages  · If you have not urinated within 8 hours after discharge, please contact your surgeon on call. Report the following to your surgeon:  · Excessive pain, swelling, redness or odor of or around the surgical area  · Temperature over 100.5  · Nausea and vomiting lasting longer than 4 hours or if unable to take medications  · Any signs of decreased circulation or nerve impairment to extremity: change in color, persistent  numbness, tingling, coldness or increase pain  · Any questions    What to do at Home:  Recommended activity: Activity as tolerated,     If you experience any of the following symptoms fever, chills, shortess of breath  please follow up with md.    *  Please give a list of your current medications to your Primary Care Provider. *  Please update this list whenever your medications are discontinued, doses are      changed, or new medications (including over-the-counter products) are added. *  Please carry medication information at all times in case of emergency situations. These are general instructions for a healthy lifestyle:    No smoking/ No tobacco products/ Avoid exposure to second hand smoke  Surgeon General's Warning:  Quitting smoking now greatly reduces serious risk to your health.     Obesity, smoking, and sedentary lifestyle greatly increases your risk for illness    A healthy diet, regular physical exercise & weight monitoring are important for maintaining a healthy lifestyle    You may be retaining fluid if you have a history of heart failure or if you experience any of the following symptoms:  Weight gain of 3 pounds or more overnight or 5 pounds in a week, increased swelling in our hands or feet or shortness of breath while lying flat in bed. Please call your doctor as soon as you notice any of these symptoms; do not wait until your next office visit. The discharge information has been reviewed with the patient. The patient verbalized understanding. Discharge medications reviewed with the patient and appropriate educational materials and side effects teaching were provided.   ___________________________________________________________________________________________________________________________________

## 2019-06-19 NOTE — PROGRESS NOTES
conducted an initial consultation and Spiritual Assessment for [facility], who is a 37 y.o.,male. Patients Primary Language is: Gracia Reasons. According to the patients EMR Confucianism Affiliation is: No preference. The reason the Patient came to the hospital is:   Patient Active Problem List    Diagnosis Date Noted    H/O parotidectomy 06/17/2019    Herpes labialis 07/30/2018    Overweight (BMI 25.0-29.9) 02/12/2018    Essential hypertension with goal blood pressure less than 140/90 07/01/2016    Idiopathic gout involving toe of right foot 07/01/2016        The  provided the following Interventions:  Initiated a relationship of care and support. Explored issues of ramakrishna, belief, spirituality and Methodist/ritual needs while hospitalized. Listened empathically. Provided information about Spiritual Care Services. Offered  assurance of continued prayers on patient's behalf. The following outcomes where achieved:  Patient shared limited information about his their medical narrative and spiritual journey/beliefs.  confirmed Patient's Confucianism Affiliation. Patient processed feeling about current hospitalization. Patient expressed gratitude for 's visit. Assessment:  Patient does not have any Methodist/cultural needs that will affect patients preferences in health care. There are no spiritual or Methodist issues which require intervention at this time. Plan:  Chaplains will continue to follow and will provide pastoral care on an as needed/requested basis.  recommends bedside caregivers page  on duty if patient shows signs of acute spiritual or emotional distress.     [address]  [phone])

## 2019-06-19 NOTE — DISCHARGE SUMMARY
[address] [place] [address]    Name:  Destiny Virgen  MR#:   [id number]  :  1985  ACCOUNT #:  [de-identified]  ADMIT DATE:  2019  DISCHARGE DATE:  2019    PREOPERATIVE DIAGNOSIS:  Left parotid mass. POSTOPERATIVE DIAGNOSIS:  Left parotid mass. OPERATIVE PROCEDURE:  Removal of left parotid mass. DISCHARGE DIET:  As tolerated. DISCHARGE ACTIVITIES:  Light activities. SPECIAL INSTRUCTIONS:  The patient to follow up with me in one to two day's time for drain removal.  He will follow up with me in one week thereafter for suture removal.  The patient also to utilize bacitracin ointment to wound b.i.d. x3 days. HOSPITAL COURSE:  The patient was admitted and underwent left parotidectomy. Postoperatively, no difficulties were encountered. The patient will be discharged to home with a drain in place. We will remove the drain in one to two day's time.   The patient will make appointment in our office for drain removal.      Nora Melton MD      [id number]  D:  2019 6:15  T:  2019 11:48  JOB #:  [id number]

## 2019-06-24 NOTE — OP NOTES
[address] [place] [address]   OPERATIVE REPORT    Name:  Caroline White  MR#:   [id number]  :  1985  ACCOUNT #:  [de-identified]  DATE OF SERVICE:  2019    PREOPERATIVE DIAGNOSIS:  Left parotid mass. POSTOPERATIVE DIAGNOSIS:  Left parotid mass. OPERATIONS PERFORMED:  1. Left parotidectomy. 2.  Sternocleidomastoid flap for coverage of facial nerve. SURGEON:  Ye Pressley MD.    ASSISTANT:  None. ANESTHESIA:  General endotracheal.    COMPLICATIONS:  None. SPECIMENS REMOVED:  To path. IMPLANTS:  None. ESTIMATED BLOOD LOSS:  50ml. DRAINS:  One Jace-Bal drain. OPERATIVE FINDINGS:  A large parotid mass, completely removed. This was noted to be adherent to the facial nerve. The facial nerve was noted to be having a significant amount of splaying making the surgical extraction somewhat difficult. Operative findings also revealed the facial nerve to have some significant manipulation to remove the intertwined parotid mass, hence a small sternocleidomastoid flap was then elevated over the facial nerve itself to prevent any further devascularization and to prevent any injury from the drain or the skin scarring onto the nerve. DESCRIPTION OF PROCEDURE:  The patient was brought to the operating room and placed supine on the operating room table. General endotracheal anesthesia was given per anesthesiology department. Once the patient was sufficiently anesthetized, a tarsorrhaphy stitch was placed in the patient's left upper and lower eyelids. It was noted to be removed at the conclusion of the procedure. This was placed to prevent any potential corneal injury. Once this was complete, the patient was prepped and draped in the usual sterile fashion. A standard parotidectomy incision was made starting in the pretracheal area and extending down to the mastoid sweeping back 90 degrees to the mastoid.   A 180 degrees turn was then made in a skin crease approximately two fingerbreadths below the angle of the mandible. Once this was incised, the incision was carried down to the proper plane. The proper plane was noted to be just at the level of the platysma. The platysma muscle was not incised at this point; however, just superficial to the platysma revealed the proper plane to be created. A fasciocutaneous flap was then elevated off the parotid gland in this proper plane. The entire parotid gland was visualized at this point. The skin and muscle flaps were anchored to the skin from whence the delineation of the parotid gland was performed. The parotid gland was  from the external auditory canal.  This was also  from the anterior border of the sternocleidomastoid. Once the sternocleidomastoid was freed up, the parotid gland was reflected superiorly and identification of the posterior belly of the digastric muscle was performed. This proved to be inferiormost plane and margin where the facial nerve might be identified. The dissection was once more continued along the external auditory canal down to the tragal pointer. The tissue between the tragal pointer and the posterior belly of the digastric was then . This was parotid tissue. The facial nerve was identified in this tissue itself. The facial nerve was then kept intact and the parotid gland was dissected off the facial nerve. This was started inferiorly where the cervical, marginal mandibular, and buccal branches were all identified with a number of anastomosing branches between these branches as well. The parotid tumor itself was noted to be very adherent to the branches along the marginal mandibular as well as some of the branches along the buccal.  Great care was maintained to ensure the integrity of the above. This was done with painstaking dissection dissecting this completely free. This allowed for freeing of the tumor.   There was some dissection of the facial nerve superiorly. The superior division was dissected free; however, the branches of the zygomatic and temporal were not dissected. At this point, it was decided to completely remove the parotid with the use of the harmonic scalpel. The freed-up margins of the parotid were completely removed. This still left a bit of a parotid superiorly along the zygomatic and temporal branches. No difficulties were encountered. There were some significant bleeding points noted and encountered during the course of the procedure. This was controlled with the use of small Ligaclips, 3-0 silk suture, as well as bipolar cautery. Copious irrigation was now performed. Valsalva maneuver was performed without any evidence of any bleeding. The facial nerve was noted to be completely unprotected. This was kept intact during the entire course of the procedure and was fully functioning at the conclusion of the procedure. To prevent any potential further injury to the facial nerve, a small myofascial flap was then elevated from the sternocleidomastoid to the facial nerve itself. This protected the facial nerve from any potential injury. This was elevated with the use of cautery and harmonic scalpel. No difficulties were encountered. This was anchored in place with 3-0 Vicryl suture. Once this was complete, the wound was now closed in two layers. The deep fasciocutaneous flap was closed with 3-0 Vicryl and the skin with 5-0 nylon. A 10 Guyanese fluted drain was placed via a separate stab wound. No other significant findings were seen. The drain was sutured in place. The patient was subsequently taken from the operating room to the recovery room in stable condition after correct needle and sponge counts were obtained.       Juan Code, MD [id number]  D:  06/24/2019 6:38  T:  06/24/2019 6:44  JOB #:  [id number]

## 2019-12-19 DIAGNOSIS — I10 ESSENTIAL HYPERTENSION WITH GOAL BLOOD PRESSURE LESS THAN 140/90: ICD-10-CM

## 2019-12-19 RX ORDER — LISINOPRIL 10 MG/1
TABLET ORAL
Qty: 90 TAB | Refills: 3 | Status: CANCELLED | OUTPATIENT
Start: 2019-12-19

## 2019-12-19 NOTE — TELEPHONE ENCOUNTER
Requested Prescriptions     Pending Prescriptions Disp Refills    lisinopril (PRINIVIL, ZESTRIL) 10 mg tablet 90 Tab 3

## 2019-12-26 DIAGNOSIS — I10 ESSENTIAL HYPERTENSION WITH GOAL BLOOD PRESSURE LESS THAN 140/90: ICD-10-CM

## 2019-12-26 NOTE — TELEPHONE ENCOUNTER
Requested Prescriptions     Pending Prescriptions Disp Refills    lisinopril (PRINIVIL, ZESTRIL) 10 mg tablet 90 Tab 3     Spoke with pt informed pt that he needs appt. He have not been seen since February. Pt would like to know if he can get one refill until his appt.

## 2019-12-27 RX ORDER — LISINOPRIL 10 MG/1
TABLET ORAL
Qty: 30 TAB | Refills: 0 | Status: SHIPPED | OUTPATIENT
Start: 2019-12-27 | End: 2020-01-06 | Stop reason: SDUPTHER

## 2020-01-06 DIAGNOSIS — I10 ESSENTIAL HYPERTENSION WITH GOAL BLOOD PRESSURE LESS THAN 140/90: ICD-10-CM

## 2020-01-06 NOTE — TELEPHONE ENCOUNTER
Requested Prescriptions     Pending Prescriptions Disp Refills    lisinopril (PRINIVIL, ZESTRIL) 10 mg tablet 30 Tab 0     Sig: TAKE 1 TABLET BY MOUTH DAILY FOR HIGH BLOOD PRESSURE

## 2020-01-08 RX ORDER — LISINOPRIL 10 MG/1
TABLET ORAL
Qty: 30 TAB | Refills: 0 | Status: SHIPPED | OUTPATIENT
Start: 2020-01-08 | End: 2020-02-06 | Stop reason: SDUPTHER

## 2020-01-09 NOTE — TELEPHONE ENCOUNTER
Requested Prescriptions     Pending Prescriptions Disp Refills    lidocaine (LIDOCAINE VISCOUS) 2 % solution 100 mL 0

## 2020-01-10 RX ORDER — LIDOCAINE HYDROCHLORIDE 20 MG/ML
SOLUTION OROPHARYNGEAL
Qty: 100 ML | Refills: 0 | Status: SHIPPED | OUTPATIENT
Start: 2020-01-10 | End: 2020-11-12 | Stop reason: ALTCHOICE

## 2020-02-06 ENCOUNTER — OFFICE VISIT (OUTPATIENT)
Dept: FAMILY MEDICINE CLINIC | Facility: CLINIC | Age: 35
End: 2020-02-06

## 2020-02-06 VITALS
HEIGHT: 70 IN | OXYGEN SATURATION: 97 % | DIASTOLIC BLOOD PRESSURE: 86 MMHG | SYSTOLIC BLOOD PRESSURE: 138 MMHG | RESPIRATION RATE: 12 BRPM | WEIGHT: 199 LBS | HEART RATE: 78 BPM | TEMPERATURE: 95.9 F | BODY MASS INDEX: 28.49 KG/M2

## 2020-02-06 DIAGNOSIS — M10.9 ACUTE GOUT OF RIGHT FOOT, UNSPECIFIED CAUSE: ICD-10-CM

## 2020-02-06 DIAGNOSIS — I10 ESSENTIAL HYPERTENSION WITH GOAL BLOOD PRESSURE LESS THAN 140/90: ICD-10-CM

## 2020-02-06 DIAGNOSIS — M10.071 IDIOPATHIC GOUT INVOLVING TOE OF RIGHT FOOT, UNSPECIFIED CHRONICITY: ICD-10-CM

## 2020-02-06 RX ORDER — MELOXICAM 15 MG/1
TABLET ORAL
Qty: 90 TAB | Refills: 3 | Status: CANCELLED | OUTPATIENT
Start: 2020-02-06

## 2020-02-06 RX ORDER — LISINOPRIL 10 MG/1
TABLET ORAL
Qty: 30 TAB | Refills: 3 | Status: SHIPPED | OUTPATIENT
Start: 2020-02-06 | End: 2020-05-26

## 2020-02-06 RX ORDER — ALLOPURINOL 100 MG/1
TABLET ORAL
Qty: 90 TAB | Refills: 1 | Status: SHIPPED | OUTPATIENT
Start: 2020-02-06 | End: 2020-11-12 | Stop reason: ALTCHOICE

## 2020-02-06 NOTE — PROGRESS NOTES
Visit Vitals  /86   Pulse 78   Temp 95.9 °F (35.5 °C) (Oral)   Resp 12   Ht 5' 10\" (1.778 m)   Wt 199 lb (90.3 kg)   SpO2 97%   BMI 28.55 kg/m²     Calista Sanchez presents today for   Chief Complaint   Patient presents with    Hypertension     follow-up    Medication Refill       Is someone accompanying this pt? no    Is the patient using any DME equipment during OV? no    Depression Screening:  3 most recent PHQ Screens 2/6/2020   Little interest or pleasure in doing things Not at all   Feeling down, depressed, irritable, or hopeless Not at all   Total Score PHQ 2 0       Learning Assessment:  Learning Assessment 7/1/2016   PRIMARY LEARNER Patient   HIGHEST LEVEL OF EDUCATION - PRIMARY LEARNER  GRADUATED HIGH SCHOOL OR GED   BARRIERS PRIMARY LEARNER NONE   PRIMARY LANGUAGE ENGLISH   LEARNER PREFERENCE PRIMARY LISTENING   ANSWERED BY patient    RELATIONSHIP SELF       Abuse Screening:  Abuse Screening Questionnaire 11/2/2018   Do you ever feel afraid of your partner? N   Are you in a relationship with someone who physically or mentally threatens you? N   Is it safe for you to go home? Y       Fall Risk  No flowsheet data found. Health Maintenance reviewed and discussed and ordered per Provider. Health Maintenance Due   Topic Date Due    DTaP/Tdap/Td series (1 - Tdap) 02/25/1994    Influenza Age 5 to Adult  08/01/2019           Coordination of Care:  1. Have you been to the ER, urgent care clinic since your last visit? Hospitalized since your last visit? no    2. Have you seen or consulted any other health care providers outside of the [address] [place] [address] since your last visit? Include any pap smears or colon screening.  no

## 2020-02-06 NOTE — PROGRESS NOTES
Luis A Ritter is a 37 y.o. male presenting today for Hypertension (follow-up) and Medication Refill    HPI:  Luis A Ritter presents to the office today for hypertension and gout follow-up care. Patient has a past medical history of hypertension. His blood pressure today is controlled at 138/86 he denies any chest pain, palpitation lower extremity edema. He presents requesting medication refills. He has no significant past medical history other than gout. He denies any cough or wheezing. He is negative for abdominal pain, nausea or vomiting. Review of Systems   Respiratory: Negative for cough and sputum production. Cardiovascular: Negative for chest pain and palpitations. Gastrointestinal: Negative for abdominal pain, nausea and vomiting. No Known Allergies    Current Outpatient Medications   Medication Sig Dispense Refill    lisinopril (PRINIVIL, ZESTRIL) 10 mg tablet TAKE 1 TABLET BY MOUTH DAILY FOR HIGH BLOOD PRESSURE 30 Tab 3    allopurinoL (ZYLOPRIM) 100 mg tablet TAKE 1 TABLET BY MOUTH DAILY FOR GOUT 90 Tab 1    meloxicam (MOBIC) 15 mg tablet TAKE 1 TABLET BY MOUTH DAILY FOR GOUT 90 Tab 3    lidocaine (LIDOCAINE VISCOUS) 2 % solution TAKE 15 ML BY MOUTH EVERY 4 HOURS AS NEEDED FOR PAIN FOR MOUTH IRRITATION 100 mL 0    valACYclovir (VALTREX) 1 gram tablet Take 2 Tabs by mouth two (2) times a day. Indications: HERPES LABIALIS 4 Tab 5       Past Medical History:   Diagnosis Date    Gout     Hypertension        History reviewed. No pertinent surgical history.     Social History     Socioeconomic History    Marital status:      Spouse name: Not on file    Number of children: Not on file    Years of education: Not on file    Highest education level: Not on file   Occupational History    Not on file   Social Needs    Financial resource strain: Not on file    Food insecurity:     Worry: Not on file     Inability: Not on file    Transportation needs:     Medical: Not on file Non-medical: Not on file   Tobacco Use    Smoking status: Never Smoker    Smokeless tobacco: Never Used   Substance and Sexual Activity    Alcohol use: No     Alcohol/week: 0.0 standard drinks    Drug use: No    Sexual activity: Yes     Partners: Female   Lifestyle    Physical activity:     Days per week: Not on file     Minutes per session: Not on file    Stress: Not on file   Relationships    Social connections:     Talks on phone: Not on file     Gets together: Not on file     Attends Rastafari service: Not on file     Active member of club or organization: Not on file     Attends meetings of clubs or organizations: Not on file     Relationship status: Not on file    Intimate partner violence:     Fear of current or ex partner: Not on file     Emotionally abused: Not on file     Physically abused: Not on file     Forced sexual activity: Not on file   Other Topics Concern    Not on file   Social History Narrative    Not on file       Patient does not have an advanced directive on file    Vitals:    02/06/20 1542   BP: 138/86   Pulse: 78   Resp: 12   Temp: 95.9 °F (35.5 °C)   TempSrc: Oral   SpO2: 97%   Weight: 199 lb (90.3 kg)   Height: 5' 10\" (1.778 m)   PainSc:   0 - No pain       Physical Exam  Vitals signs and nursing note reviewed. Constitutional:       Appearance: Normal appearance. Cardiovascular:      Rate and Rhythm: Normal rate and regular rhythm. Heart sounds: Normal heart sounds. Pulmonary:      Effort: Pulmonary effort is normal.      Breath sounds: Normal breath sounds. Neurological:      Mental Status: He is alert. No visits with results within 3 Month(s) from this visit.    Latest known visit with results is:   Hospital Outpatient Visit on 06/10/2019   Component Date Value Ref Range Status    Ventricular Rate 06/10/2019 81  BPM Final    Atrial Rate 06/10/2019 81  BPM Final    P-R Interval 06/10/2019 132  ms Final    QRS Duration 06/10/2019 96  ms Final    Q-T Interval 06/10/2019 312  ms Final    QTC Calculation (Bezet) 06/10/2019 362  ms Final    Calculated P Axis 06/10/2019 23  degrees Final    Calculated R Axis 06/10/2019 25  degrees Final    Calculated T Axis 06/10/2019 28  degrees Final    Diagnosis 06/10/2019    Final                    Value:Normal sinus rhythm  Normal ECG  No previous ECGs available  Confirmed by Magda Woody MD, Reggy Box ([id number]) on 6/11/2019 8:35:38 AM         .No results found for any visits on 02/06/20. Assessment / Plan:      ICD-10-CM ICD-9-CM    1. Essential hypertension with goal blood pressure less than 140/90 I10 401.9 lisinopril (PRINIVIL, ZESTRIL) 10 mg tablet   2. Idiopathic gout involving toe of right foot, unspecified chronicity M10.071 274.9 allopurinoL (ZYLOPRIM) 100 mg tablet   3. Acute gout of right foot, unspecified cause M10.9 274.01      Lisinopril refill  Fill allopurinol. Patient has a history of gout. Follow-up and Dispositions    · Return in about 4 weeks (around 3/5/2020). I asked the patient if he  had any questions and answered his  questions. The patient stated that he understands the treatment plan and agrees with the treatment plan    This document was created with a voice activated dictation system and may contain transcription errors.

## 2020-03-05 ENCOUNTER — HOSPITAL ENCOUNTER (EMERGENCY)
Age: 35
Discharge: HOME | End: 2020-03-05
Attending: EMERGENCY MEDICINE
Payer: COMMERCIAL

## 2020-03-05 VITALS
TEMPERATURE: 97.5 F | RESPIRATION RATE: 16 BRPM | SYSTOLIC BLOOD PRESSURE: 135 MMHG | OXYGEN SATURATION: 99 % | HEART RATE: 88 BPM | DIASTOLIC BLOOD PRESSURE: 79 MMHG

## 2020-03-05 DIAGNOSIS — M54.50 ACUTE MIDLINE LOW BACK PAIN WITHOUT SCIATICA: Primary | ICD-10-CM

## 2020-03-05 PROCEDURE — 99282 EMERGENCY DEPT VISIT SF MDM: CPT

## 2020-03-05 RX ORDER — IBUPROFEN 800 MG/1
800 TABLET ORAL EVERY 8 HOURS
Qty: 15 TAB | Refills: 0 | Status: SHIPPED | OUTPATIENT
Start: 2020-03-05 | End: 2020-03-10

## 2020-03-05 RX ORDER — SENNOSIDES 25 MG/1
TABLET, FILM COATED ORAL
Qty: 1 TUBE | Refills: 0 | Status: SHIPPED | OUTPATIENT
Start: 2020-03-05 | End: 2020-11-12 | Stop reason: ALTCHOICE

## 2020-03-05 RX ORDER — METAXALONE 800 MG/1
800 TABLET ORAL 4 TIMES DAILY
Qty: 20 TAB | Refills: 0 | Status: SHIPPED | OUTPATIENT
Start: 2020-03-05 | End: 2020-03-10

## 2020-03-05 RX ORDER — OXYCODONE AND ACETAMINOPHEN 5; 325 MG/1; MG/1
1 TABLET ORAL
Qty: 6 TAB | Refills: 0 | Status: SHIPPED | OUTPATIENT
Start: 2020-03-05 | End: 2020-03-07

## 2020-03-05 NOTE — DISCHARGE INSTRUCTIONS
Patient Education        Learning About Relief for Back Pain  What is back tension and strain? Back strain happens when you overstretch, or pull, a muscle in your back. You may hurt your back in an accident or when you exercise or lift something. Most back pain will get better with rest and time. You can take care of yourself at home to help your back heal.  What can you do first to relieve back pain? When you first feel back pain, try these steps:  · Walk. Take a short walk (10 to 20 minutes) on a level surface (no slopes, hills, or stairs) every 2 to 3 hours. Walk only distances you can manage without pain, especially leg pain. · Relax. Find a comfortable position for rest. Some people are comfortable on the floor or a medium-firm bed with a small pillow under their head and another under their knees. Some people prefer to lie on their side with a pillow between their knees. Don't stay in one position for too long. · Try heat or ice. Try using a heating pad on a low or medium setting, or take a warm shower, for 15 to 20 minutes every 2 to 3 hours. Or you can buy single-use heat wraps that last up to 8 hours. You can also try an ice pack for 10 to 15 minutes every 2 to 3 hours. You can use an ice pack or a bag of frozen vegetables wrapped in a thin towel. There is not strong evidence that either heat or ice will help, but you can try them to see if they help. You may also want to try switching between heat and cold. · Take pain medicine exactly as directed. ? If the doctor gave you a prescription medicine for pain, take it as prescribed. ? If you are not taking a prescription pain medicine, ask your doctor if you can take an over-the-counter medicine. What else can you do? · Stretch and exercise. Exercises that increase flexibility may relieve your pain and make it easier for your muscles to keep your spine in a good, neutral position. And don't forget to keep walking. · Do self-massage.  You can use self-massage to unwind after work or school or to energize yourself in the morning. You can easily massage your feet, hands, or neck. Self-massage works best if you are in comfortable clothes and are sitting or lying in a comfortable position. Use oil or lotion to massage bare skin. · Reduce stress. Back pain can lead to a vicious [place]: Distress about the pain tenses the muscles in your back, which in turn causes more pain. Learn how to relax your mind and your muscles to lower your stress. Where can you learn more? Go to http://gregorio-paresh.info/. Enter B242 in the search box to learn more about \"Learning About Relief for Back Pain. \"  Current as of: June 26, 2019  Content Version: 12.2  © 8945-6094 [employer]. Care instructions adapted under license by [employer] (which disclaims liability or warranty for this information). If you have questions about a medical condition or this instruction, always ask your healthcare professional. [address] any warranty or liability for your use of this information.

## 2020-03-05 NOTE — ED TRIAGE NOTES
C/o lower back pain denies injuries. States wife is bedridden and he has been assisting her. Denies numbness tingling.  Los of bowel or urine

## 2020-03-05 NOTE — ED PROVIDER NOTES
EMERGENCY DEPARTMENT HISTORY AND PHYSICAL EXAM    Date: 3/5/2020  Patient Name: Stefano Sanders    History of Presenting Illness     Chief Complaint   Patient presents with    Back Pain         History Provided By: Patient    Additional History (Context): Stefano Sanders is a 38 y.o. male with hypertension and gout who presents with onset of low back pain this morning after lifting his 3 pound wife from bed. Denies saddle anesthesia bowel incontinence fever IVDU rash unintentional weight loss in the past 6 months rectal pain urinary retention. Denies sciatica. PCP: Karolyn Anthony NP    Current Outpatient Medications   Medication Sig Dispense Refill    ibuprofen (MOTRIN) 800 mg tablet Take 1 Tab by mouth every eight (8) hours for 5 days. 15 Tab 0    metaxalone (SKELAXIN) 800 mg tablet Take 1 Tab by mouth four (4) times daily for 5 days. 20 Tab 0    oxyCODONE-acetaminophen (PERCOCET) 5-325 mg per tablet Take 1 Tab by mouth every six (6) hours as needed for Pain for up to 2 days. Max Daily Amount: 4 Tabs. Take 1 tablet every 6 hours as needed for pain control. 6 Tab 0    lidocaine 5 % topical cream Apply  to affected area two (2) times daily as needed for Pain. 1 Tube 0    lisinopril (PRINIVIL, ZESTRIL) 10 mg tablet TAKE 1 TABLET BY MOUTH DAILY FOR HIGH BLOOD PRESSURE 30 Tab 3    allopurinoL (ZYLOPRIM) 100 mg tablet TAKE 1 TABLET BY MOUTH DAILY FOR GOUT 90 Tab 1    lidocaine (LIDOCAINE VISCOUS) 2 % solution TAKE 15 ML BY MOUTH EVERY 4 HOURS AS NEEDED FOR PAIN FOR MOUTH IRRITATION 100 mL 0    valACYclovir (VALTREX) 1 gram tablet Take 2 Tabs by mouth two (2) times a day. Indications: HERPES LABIALIS 4 Tab 5    meloxicam (MOBIC) 15 mg tablet TAKE 1 TABLET BY MOUTH DAILY FOR GOUT 90 Tab 3       Past History     Past Medical History:  Past Medical History:   Diagnosis Date    Gout     Hypertension        Past Surgical History:  History reviewed. No pertinent surgical history.     Family History:  Family History   Problem Relation Age of Onset    Heart Disease Mother     Heart Disease Maternal Grandfather     Stroke Maternal Grandfather     Hypertension Maternal Grandfather     Breast Cancer Paternal Grandmother     Heart Attack Paternal Grandfather        Social History:  Social History     Tobacco Use    Smoking status: Never Smoker    Smokeless tobacco: Never Used   Substance Use Topics    Alcohol use: No     Alcohol/week: 0.0 standard drinks    Drug use: No       Allergies:  No Known Allergies      Review of Systems   Review of Systems   Constitutional: Negative for fever and unexpected weight change. Musculoskeletal: Positive for back pain. Skin: Negative for rash and wound. Neurological: Negative for weakness and numbness. All Other Systems Negative  Physical Exam     Vitals:    03/05/20 1442   BP: 135/79   Pulse: 88   Resp: 16   Temp: 97.5 °F (36.4 °C)   SpO2: 99%     Physical Exam  Vitals signs and nursing note reviewed. Constitutional:       General: He is not in acute distress. Appearance: He is well-developed. He is not ill-appearing, toxic-appearing or diaphoretic. HENT:      Head: Normocephalic and atraumatic. Neck:      Musculoskeletal: Normal range of motion and neck supple. Thyroid: No thyromegaly. Vascular: No carotid bruit. Trachea: No tracheal deviation. Cardiovascular:      Rate and Rhythm: Normal rate and regular rhythm. Heart sounds: Normal heart sounds. No murmur. No friction rub. No gallop. Pulmonary:      Effort: Pulmonary effort is normal. No respiratory distress. Breath sounds: Normal breath sounds. No stridor. No wheezing or rales. Chest:      Chest wall: No tenderness. Abdominal:      General: There is no distension. Palpations: Abdomen is soft. There is no mass. Tenderness: There is no abdominal tenderness. There is no guarding or rebound. Musculoskeletal: Normal range of motion.          General: Tenderness present. Comments: Midline lumbar spine inferior tenderness. Skin:     General: Skin is warm and dry. Coloration: Skin is not pale. Neurological:      Mental Status: He is alert. Psychiatric:         Speech: Speech normal.         Behavior: Behavior normal.         Thought Content: Thought content normal.         Judgment: Judgment normal.          Diagnostic Study Results     Labs -   No results found for this or any previous visit (from the past 12 hour(s)). Radiologic Studies -   No orders to display     CT Results  (Last 48 hours)    None        CXR Results  (Last 48 hours)    None            Medical Decision Making   I am the first provider for this patient. I reviewed the vital signs, available nursing notes, past medical history, past surgical history, family history and social history. Vital Signs-Reviewed the patient's vital signs. Procedures:  Procedures    Provider Notes (Medical Decision Making): Treat his acute low back pain. Follow-up with his PCP. No red flags. MED RECONCILIATION:  No current facility-administered medications for this encounter. Current Outpatient Medications   Medication Sig    ibuprofen (MOTRIN) 800 mg tablet Take 1 Tab by mouth every eight (8) hours for 5 days.  metaxalone (SKELAXIN) 800 mg tablet Take 1 Tab by mouth four (4) times daily for 5 days.  oxyCODONE-acetaminophen (PERCOCET) 5-325 mg per tablet Take 1 Tab by mouth every six (6) hours as needed for Pain for up to 2 days. Max Daily Amount: 4 Tabs. Take 1 tablet every 6 hours as needed for pain control.  lidocaine 5 % topical cream Apply  to affected area two (2) times daily as needed for Pain.     lisinopril (PRINIVIL, ZESTRIL) 10 mg tablet TAKE 1 TABLET BY MOUTH DAILY FOR HIGH BLOOD PRESSURE    allopurinoL (ZYLOPRIM) 100 mg tablet TAKE 1 TABLET BY MOUTH DAILY FOR GOUT    lidocaine (LIDOCAINE VISCOUS) 2 % solution TAKE 15 ML BY MOUTH EVERY 4 HOURS AS NEEDED FOR PAIN FOR MOUTH IRRITATION    valACYclovir (VALTREX) 1 gram tablet Take 2 Tabs by mouth two (2) times a day. Indications: HERPES LABIALIS    meloxicam (MOBIC) 15 mg tablet TAKE 1 TABLET BY MOUTH DAILY FOR GOUT       Disposition:  home    DISCHARGE NOTE:   3:56 PM    Pt has been reexamined. Patient has no new complaints, changes, or physical findings. Care plan outlined and precautions discussed. Results of exam were reviewed with the patient. All medications were reviewed with the patient; will d/c home with see below. All of pt's questions and concerns were addressed. Patient was instructed and agrees to follow up with PCP, as well as to return to the ED upon further deterioration. Patient is ready to go home. Follow-up Information     Follow up With Specialties Details Why Contact Info    Magaly Dillon NP Nurse Practitioner Schedule an appointment as soon as possible for a visit in 1 day  [address] [place] [address]  [phone] [address] [facility] EMERGENCY DEPT Emergency Medicine  If symptoms worsen return immediately [address] [place]  [phone]          Current Discharge Medication List      START taking these medications    Details   ibuprofen (MOTRIN) 800 mg tablet Take 1 Tab by mouth every eight (8) hours for 5 days. Qty: 15 Tab, Refills: 0      metaxalone (SKELAXIN) 800 mg tablet Take 1 Tab by mouth four (4) times daily for 5 days. Qty: 20 Tab, Refills: 0      oxyCODONE-acetaminophen (PERCOCET) 5-325 mg per tablet Take 1 Tab by mouth every six (6) hours as needed for Pain for up to 2 days. Max Daily Amount: 4 Tabs. Take 1 tablet every 6 hours as needed for pain control. Qty: 6 Tab, Refills: 0    Associated Diagnoses: Acute midline low back pain without sciatica      lidocaine 5 % topical cream Apply  to affected area two (2) times daily as needed for Pain. Qty: 1 Tube, Refills: 0                 Diagnosis     Clinical Impression:   1.  Acute midline low back pain without sciatica

## 2020-04-22 DIAGNOSIS — M10.9 ACUTE GOUT OF RIGHT FOOT, UNSPECIFIED CAUSE: ICD-10-CM

## 2020-04-22 NOTE — TELEPHONE ENCOUNTER
Last seen 02/06/20  Next appt  None    Requested Prescriptions     Pending Prescriptions Disp Refills    meloxicam (MOBIC) 15 mg tablet 90 Tab 3

## 2020-04-27 RX ORDER — MELOXICAM 15 MG/1
TABLET ORAL
Qty: 90 TAB | Refills: 0 | Status: SHIPPED | OUTPATIENT
Start: 2020-04-27 | End: 2020-11-12 | Stop reason: ALTCHOICE

## 2020-05-16 ENCOUNTER — HOSPITAL ENCOUNTER (EMERGENCY)
Age: 35
Discharge: HOME | End: 2020-05-16
Attending: EMERGENCY MEDICINE
Payer: COMMERCIAL

## 2020-05-16 VITALS
DIASTOLIC BLOOD PRESSURE: 82 MMHG | BODY MASS INDEX: 28 KG/M2 | HEIGHT: 71 IN | TEMPERATURE: 97 F | HEART RATE: 88 BPM | RESPIRATION RATE: 16 BRPM | WEIGHT: 200 LBS | OXYGEN SATURATION: 99 % | SYSTOLIC BLOOD PRESSURE: 122 MMHG

## 2020-05-16 DIAGNOSIS — Z20.822 SUSPECTED COVID-19 VIRUS INFECTION: ICD-10-CM

## 2020-05-16 DIAGNOSIS — J02.9 SORE THROAT: Primary | ICD-10-CM

## 2020-05-16 LAB — DEPRECATED S PYO AG THROAT QL EIA: NEGATIVE

## 2020-05-16 PROCEDURE — 99281 EMR DPT VST MAYX REQ PHY/QHP: CPT

## 2020-05-16 PROCEDURE — 87635 SARS-COV-2 COVID-19 AMP PRB: CPT

## 2020-05-16 PROCEDURE — 87070 CULTURE OTHR SPECIMN AEROBIC: CPT

## 2020-05-16 PROCEDURE — 87880 STREP A ASSAY W/OPTIC: CPT

## 2020-05-16 NOTE — DISCHARGE INSTRUCTIONS
Patient Education        Sore Throat: Care Instructions  Your Care Instructions    Infection by bacteria or a virus causes most sore throats. Cigarette smoke, dry air, air pollution, allergies, and yelling can also cause a sore throat. Sore throats can be painful and annoying. Fortunately, most sore throats go away on their own. If you have a bacterial infection, your doctor may prescribe antibiotics. Follow-up care is a key part of your treatment and safety. Be sure to make and go to all appointments, and call your doctor if you are having problems. It's also a good idea to know your test results and keep a list of the medicines you take. How can you care for yourself at home? · If your doctor prescribed antibiotics, take them as directed. Do not stop taking them just because you feel better. You need to take the full course of antibiotics. · Gargle with warm salt water once an hour to help reduce swelling and relieve discomfort. Use 1 teaspoon of salt mixed in 1 cup of warm water. · Take an over-the-counter pain medicine, such as acetaminophen (Tylenol), ibuprofen (Advil, Motrin), or naproxen (Aleve). Read and follow all instructions on the label. · Be careful when taking over-the-counter cold or flu medicines and Tylenol at the same time. Many of these medicines have acetaminophen, which is Tylenol. Read the labels to make sure that you are not taking more than the recommended dose. Too much acetaminophen (Tylenol) can be harmful. · Drink plenty of fluids. Fluids may help soothe an irritated throat. Hot fluids, such as tea or soup, may help decrease throat pain. · Use over-the-counter throat lozenges to soothe pain. Regular cough drops or hard candy may also help. These should not be given to young children because of the risk of choking. · Do not smoke or allow others to smoke around you. If you need help quitting, talk to your doctor about stop-smoking programs and medicines.  These can increase your chances of quitting for good. · Use a vaporizer or humidifier to add moisture to your bedroom. Follow the directions for cleaning the machine. When should you call for help? Call your doctor now or seek immediate medical care if:    · You have new or worse trouble swallowing.     · Your sore throat gets much worse on one side.    Watch closely for changes in your health, and be sure to contact your doctor if you do not get better as expected. Where can you learn more? Go to http://gregorio-paresh.info/  Enter U420 in the search box to learn more about \"Sore Throat: Care Instructions. \"  Current as of: July 28, 2019Content Version: 12.4  © 5247-8742 [employer]. Care instructions adapted under license by [employer] (which disclaims liability or warranty for this information). If you have questions about a medical condition or this instruction, always ask your healthcare professional. [place] [address] any warranty or liability for your use of this information. Patient Education        Viral Infections: Care Instructions  Your Care Instructions    You don't feel well, but it's not clear what's causing it. You may have a viral infection. Viruses cause many illnesses, such as the common cold, influenza, fever, rashes, and the diarrhea, nausea, and vomiting that are often called \"stomach flu. \" You may wonder if antibiotic medicines could make you feel better. But antibiotics only treat infections caused by bacteria. They don't work on viruses. The good news is that viral infections usually aren't serious. Most will go away in a few days without medical treatment. In the meantime, there are a few things you can do to make yourself more comfortable. Follow-up care is a key part of your treatment and safety. Be sure to make and go to all appointments, and call your doctor if you are having problems.  It's also a good idea to know your test results and keep a list of the medicines you take. How can you care for yourself at home? · Get plenty of rest if you feel tired. · Take an over-the-counter pain medicine if needed, such as acetaminophen (Tylenol), ibuprofen (Advil, Motrin), or naproxen (Aleve). Read and follow all instructions on the label. · Be careful when taking over-the-counter cold or flu medicines and Tylenol at the same time. Many of these medicines have acetaminophen, which is Tylenol. Read the labels to make sure that you are not taking more than the recommended dose. Too much acetaminophen (Tylenol) can be harmful. · Drink plenty of fluids, enough so that your urine is light yellow or clear like water. If you have kidney, heart, or liver disease and have to limit fluids, talk with your doctor before you increase the amount of fluids you drink. · Stay home from work, school, and other public places while you have a fever. When should you call for help? Call 911 anytime you think you may need emergency care. For example, call if:    · You have severe trouble breathing.     · You passed out (lost consciousness).    Call your doctor now or seek immediate medical care if:    · You seem to be getting much sicker.     · You have a new or higher fever.     · You have blood in your stools.     · You have new belly pain, or your pain gets worse.     · You have a new rash.    Watch closely for changes in your health, and be sure to contact your doctor if:    · You start to get better and then get worse.     · You do not get better as expected. Where can you learn more? Go to http://gregorio-paresh.info/  Enter L906 in the search box to learn more about \"Viral Infections: Care Instructions. \"  Current as of: January 26, 2020Content Version: 12.4  © 1202-6471 [employer]. Care instructions adapted under license by [employer] (which disclaims liability or warranty for this information).  If you have questions about a medical condition or this instruction, always ask your healthcare professional. [place] [address] any warranty or liability for your use of this information.

## 2020-05-16 NOTE — LETTER
NOTIFICATION RETURN TO WORK / SCHOOL 
 
5/16/2020 12:28 PM 
 
Mr. Kd Galindo 
[address] [facility] [address] To Whom It May Concern: 
 
Kd Galindo is currently under the care of [facility]. He will return to work/school on: 5/23/20. If there are questions or concerns please have the patient contact our office.  
 
 
 
Sincerely, 
 
 
Rob Hewitt PA-C

## 2020-05-16 NOTE — ED PROVIDER NOTES
EMERGENCY DEPARTMENT HISTORY AND PHYSICAL EXAM    Date: 5/16/2020  Patient Name: Ángel Diaz    History of Presenting Illness     No chief complaint on file. History Provided By: Patient  Additional History (Context): Ángel Diaz is a 38 y.o. male with No significant past medical history who presents with sore throat x 2d. Denies fever, cough, SOB, abd pain, diarrhea or other symptoms for COVID. His wife's immune system is compromised and he's worried he could have the illness. PCP: Brooklynn Mathews NP    Current Outpatient Medications   Medication Sig Dispense Refill    meloxicam (MOBIC) 15 mg tablet TAKE 1 TABLET BY MOUTH DAILY FOR GOUT 90 Tab 0    lidocaine 5 % topical cream Apply  to affected area two (2) times daily as needed for Pain. 1 Tube 0    lisinopril (PRINIVIL, ZESTRIL) 10 mg tablet TAKE 1 TABLET BY MOUTH DAILY FOR HIGH BLOOD PRESSURE 30 Tab 3    allopurinoL (ZYLOPRIM) 100 mg tablet TAKE 1 TABLET BY MOUTH DAILY FOR GOUT 90 Tab 1    lidocaine (LIDOCAINE VISCOUS) 2 % solution TAKE 15 ML BY MOUTH EVERY 4 HOURS AS NEEDED FOR PAIN FOR MOUTH IRRITATION 100 mL 0    valACYclovir (VALTREX) 1 gram tablet Take 2 Tabs by mouth two (2) times a day. Indications: HERPES LABIALIS 4 Tab 5       Past History     Past Medical History:  Past Medical History:   Diagnosis Date    Gout     Hypertension        Past Surgical History:  No past surgical history on file.     Family History:  Family History   Problem Relation Age of Onset    Heart Disease Mother     Heart Disease Maternal Grandfather     Stroke Maternal Grandfather     Hypertension Maternal Grandfather     Breast Cancer Paternal Grandmother     Heart Attack Paternal Grandfather        Social History:  Social History     Tobacco Use    Smoking status: Never Smoker    Smokeless tobacco: Never Used   Substance Use Topics    Alcohol use: No     Alcohol/week: 0.0 standard drinks    Drug use: No       Allergies:  No Known Allergies      Review of Systems   Review of Systems   Constitutional: Negative for fever. HENT: Positive for sore throat. Respiratory: Negative for cough and shortness of breath. Gastrointestinal: Negative for diarrhea, nausea and vomiting. All Other Systems Negative  Physical Exam     Vitals:    05/16/20 1046   BP: 122/82   Pulse: 88   Resp: 16   Temp: 97 °F (36.1 °C)   SpO2: 99%   Weight: 90.7 kg (200 lb)   Height: 5' 11\" (1.803 m)     Physical Exam  Vitals signs and nursing note reviewed. Constitutional:       General: He is not in acute distress. Appearance: He is well-developed. He is not ill-appearing, toxic-appearing or diaphoretic. HENT:      Head: Normocephalic and atraumatic. Mouth/Throat:      Pharynx: Posterior oropharyngeal erythema present. No oropharyngeal exudate. Neck:      Musculoskeletal: Normal range of motion and neck supple. No muscular tenderness. Thyroid: No thyromegaly. Vascular: No carotid bruit. Trachea: No tracheal deviation. Cardiovascular:      Rate and Rhythm: Normal rate and regular rhythm. Heart sounds: Normal heart sounds. No murmur. No friction rub. No gallop. Pulmonary:      Effort: Pulmonary effort is normal. No respiratory distress. Breath sounds: Normal breath sounds. No stridor. No wheezing or rales. Chest:      Chest wall: No tenderness. Abdominal:      General: There is no distension. Palpations: Abdomen is soft. There is no mass. Tenderness: There is no abdominal tenderness. There is no guarding or rebound. Musculoskeletal: Normal range of motion. Lymphadenopathy:      Cervical: No cervical adenopathy. Skin:     General: Skin is warm and dry. Coloration: Skin is not pale. Neurological:      Mental Status: He is alert. Psychiatric:         Speech: Speech normal.         Behavior: Behavior normal.         Thought Content:  Thought content normal.         Judgment: Judgment normal. Diagnostic Study Results     Labs -     Recent Results (from the past 12 hour(s))   STREP AG SCREEN, GROUP A    Collection Time: 05/16/20 11:45 AM   Result Value Ref Range    Group A Strep Ag ID Negative         Radiologic Studies -   No orders to display     CT Results  (Last 48 hours)    None        CXR Results  (Last 48 hours)    None            Medical Decision Making   I am the first provider for this patient. I reviewed the vital signs, available nursing notes, past medical history, past surgical history, family history and social history. Vital Signs-Reviewed the patient's vital signs. Procedures:  Procedures    Provider Notes (Medical Decision Making):   Send for strep and COVID studies. Negative strep; instructed to quarantine and COVID test results available on line in 2-4d. MED RECONCILIATION:  No current facility-administered medications for this encounter. Current Outpatient Medications   Medication Sig    meloxicam (MOBIC) 15 mg tablet TAKE 1 TABLET BY MOUTH DAILY FOR GOUT    lidocaine 5 % topical cream Apply  to affected area two (2) times daily as needed for Pain.  lisinopril (PRINIVIL, ZESTRIL) 10 mg tablet TAKE 1 TABLET BY MOUTH DAILY FOR HIGH BLOOD PRESSURE    allopurinoL (ZYLOPRIM) 100 mg tablet TAKE 1 TABLET BY MOUTH DAILY FOR GOUT    lidocaine (LIDOCAINE VISCOUS) 2 % solution TAKE 15 ML BY MOUTH EVERY 4 HOURS AS NEEDED FOR PAIN FOR MOUTH IRRITATION    valACYclovir (VALTREX) 1 gram tablet Take 2 Tabs by mouth two (2) times a day. Indications: HERPES LABIALIS       Disposition:  home    DISCHARGE NOTE:   12:24 PM    Pt has been reexamined. Patient has no new complaints, changes, or physical findings. Care plan outlined and precautions discussed. Results of labs were reviewed with the patient. All medications were reviewed with the patient. All of pt's questions and concerns were addressed.  Patient was instructed and agrees to follow up with PCP, as well as to return to the ED upon further deterioration. Patient is ready to go home. Follow-up Information     Follow up With Specialties Details Why Contact Info    Albin Espinal NP Nurse Practitioner Schedule an appointment as soon as possible for a visit in 2 days  [address] [place] [phone]  [phone]      [facility] EM[facility] Emergency Medicine  If symptoms worsen return immediately [address]  [phone]          Current Discharge Medication List              Clinical Impression:   1. Sore throat    2.  Suspected COVID-19 virus infection

## 2020-05-18 LAB — SARS-COV-2, COV2NT: NOT DETECTED

## 2020-05-19 ENCOUNTER — PATIENT OUTREACH (OUTPATIENT)
Dept: CASE MANAGEMENT | Age: 35
End: 2020-05-19

## 2020-05-19 LAB
BACTERIA SPEC CULT: NORMAL
SERVICE CMNT-IMP: NORMAL

## 2020-05-19 NOTE — PROGRESS NOTES
Patient contacted regarding COVID-19  risk. Care Transition Nurse/ Ambulatory Care Manager contacted the patient by telephone to perform post discharge assessment. Verified name and  with patient as identifiers. Provided introduction to self, and explanation of the CTN/ACM role, and reason for call due to risk factors for infection and/or exposure to COVID-19. Symptoms reviewed with patient who verbalized the following symptoms: no new symptoms. Sore throat \"is getting better\". Due to no new or worsening symptoms encounter was not routed to provider for escalation. Patient has following risk factors of: no known risk factors. CTN/ACM reviewed discharge instructions, medical action plan and red flags such as increased shortness of breath, increasing fever and signs of decompensation with patient who verbalized understanding. Discussed exposure protocols and quarantine with CDC Guidelines What to do if you are sick with coronavirus disease .  Patient was given an opportunity for questions and concerns. The patient agrees to contact the [facility] exposure line [phone], [facility]  ([phone]) and PCP office for questions related to their healthcare. CTN/ACM provided contact information for future needs. Reviewed and educated patient on any new and changed medications related to discharge diagnosis. Patient/family/caregiver given information for [employer] and agrees to enroll no  Patient's preferred e-mail:  NA  Patient's preferred phone number: NA  Based on Loop alert triggers, patient will be contacted by nurse care manager for worsening symptoms. Plan for follow-up call in 5-7 days based on severity of symptoms and risk factors. Patient tested for COVID-19 on 20. Results 20 - Not Detected; patient made aware of test results.  States he is relieved because his wife is immunocompromised and he was afraid that if he was positive she might get it.

## 2020-08-20 ENCOUNTER — HOSPITAL ENCOUNTER (EMERGENCY)
Age: 35
Discharge: HOME | End: 2020-08-20
Attending: EMERGENCY MEDICINE
Payer: COMMERCIAL

## 2020-08-20 VITALS
RESPIRATION RATE: 16 BRPM | HEART RATE: 86 BPM | TEMPERATURE: 97.8 F | SYSTOLIC BLOOD PRESSURE: 117 MMHG | DIASTOLIC BLOOD PRESSURE: 79 MMHG | OXYGEN SATURATION: 95 %

## 2020-08-20 DIAGNOSIS — S61.210A LACERATION OF RIGHT INDEX FINGER WITHOUT FOREIGN BODY WITHOUT DAMAGE TO NAIL, INITIAL ENCOUNTER: Primary | ICD-10-CM

## 2020-08-20 PROCEDURE — 75810000293 HC SIMP/SUPERF WND  RPR

## 2020-08-20 PROCEDURE — 99281 EMR DPT VST MAYX REQ PHY/QHP: CPT

## 2020-08-20 PROCEDURE — 90471 IMMUNIZATION ADMIN: CPT

## 2020-08-20 PROCEDURE — 90715 TDAP VACCINE 7 YRS/> IM: CPT | Performed by: PHYSICIAN ASSISTANT

## 2020-08-20 PROCEDURE — 74011250636 HC RX REV CODE- 250/636: Performed by: PHYSICIAN ASSISTANT

## 2020-08-20 RX ADMIN — TETANUS TOXOID, REDUCED DIPHTHERIA TOXOID AND ACELLULAR PERTUSSIS VACCINE, ADSORBED 0.5 ML: 5; 2.5; 8; 8; 2.5 SUSPENSION INTRAMUSCULAR at 13:18

## 2020-08-20 NOTE — ED NOTES
PA Marylen Rickers has  reviewed discharge instructions with the patient. The patient verbalized understanding.

## 2020-08-20 NOTE — DISCHARGE INSTRUCTIONS
Patient Education        Cuts Closed With Adhesives: Care Instructions  Your Care Instructions  A cut can happen anywhere on your body. The doctor used an adhesive to close the cut. When the adhesive dries, it forms a film that holds the edges of the cut together. Skin adhesives are sometimes called liquid stitches. If the cut went deep and through the skin, the doctor may have put in a layer of stitches below the adhesive. The deeper layer of stitches brings the deep part of the cut together. These stitches will dissolve and don't need to be removed. You don't see the stitches, only the adhesive. You may have a bandage. The doctor has checked you carefully, but problems can develop later. If you notice any problems or new symptoms, get medical treatment right away. Follow-up care is a key part of your treatment and safety. Be sure to make and go to all appointments, and call your doctor if you are having problems. It's also a good idea to know your test results and keep a list of the medicines you take. How can you care for yourself at home? · Keep the cut dry for the first 24 to 48 hours. After this, you can shower if your doctor okays it. Pat the cut dry. · Don't soak the cut, such as in a bathtub. Your doctor will tell you when it's safe to get the cut wet. · If your doctor told you how to care for your cut, follow your doctor's instructions. If you did not get instructions, follow this general advice:  ? Do not put any kind of ointment, cream, or lotion over the area. This can make the adhesive fall off too soon. ? After the first 24 to 48 hours, wash around the cut with clean water 2 times a day. Do not use hydrogen peroxide or alcohol, which can slow healing. ? If the doctor told you to use a bandage, put on a new bandage after cleaning the cut or if the bandage gets wet or dirty. · Prop up the sore area on a pillow anytime you sit or lie down during the next 3 days.  Try to keep it above the level of your heart. This will help reduce swelling. · Leave the skin adhesive on your skin until it falls off on its own. This may take 5 to 10 days. · Do not scratch, rub, or pick at the adhesive. · Do not put the sticky part of a bandage directly on the adhesive. · Avoid any activity that could cause your cut to reopen. · Be safe with medicines. Read and follow all instructions on the label. ? If the doctor gave you a prescription medicine for pain, take it as prescribed. ? If you are not taking a prescription pain medicine, ask your doctor if you can take an over-the-counter medicine. When should you call for help? Call your doctor now or seek immediate medical care if:  · You have new pain, or your pain gets worse. · The skin near the cut is cold or pale or changes color. · You have tingling, weakness, or numbness near the cut. · The cut starts to bleed. · You have trouble moving the area near the cut. · You have symptoms of infection, such as:  ? Increased pain, swelling, warmth, or redness around the cut.  ? Red streaks leading from the cut.  ? Pus draining from the cut.  ? A fever. Watch closely for changes in your health, and be sure to contact your doctor if:  · The cut reopens. · You do not get better as expected. Where can you learn more? Go to http://www.gray.com/  Enter P174 in the search box to learn more about \"Cuts Closed With Adhesives: Care Instructions. \"  Current as of: June 26, 2019               Content Version: 12.5  © 2262-8061 [employer], [employer]. Care instructions adapted under license by [employer] (which disclaims liability or warranty for this information). If you have questions about a medical condition or this instruction, always ask your healthcare professional. [address] any warranty or liability for your use of this information.

## 2020-08-20 NOTE — ED PROVIDER NOTES
EMERGENCY DEPARTMENT HISTORY AND PHYSICAL EXAM      Date: 8/20/2020  Patient Name: Reina Chance    History of Presenting Illness     Chief Complaint   Patient presents with    Laceration       History Provided By: Patient    HPI: Reina Chance, 38 y.o. male PMHx significant for hypertension, gout presents ambulatory to the ED with cc of injury to right second finger that occurred 3 hours PTA. Patient reports he was lifting a piece of metal from backyard and was cut. Not UTD on tetanus. Denies numbness/tingling, radiating pain. Denies taking blood thinners. Patient has not taken anything for symptoms. There are no other complaints, changes, or physical findings at this time. PCP: Jeanne Medrano NP    No current facility-administered medications on file prior to encounter. Current Outpatient Medications on File Prior to Encounter   Medication Sig Dispense Refill    lisinopriL (PRINIVIL, ZESTRIL) 10 mg tablet TAKE 1 TABLET BY MOUTH ONCE DAILY FOR HIGH BLOOD PRESSURE 30 Tab 2    meloxicam (MOBIC) 15 mg tablet TAKE 1 TABLET BY MOUTH DAILY FOR GOUT 90 Tab 0    lidocaine 5 % topical cream Apply  to affected area two (2) times daily as needed for Pain. 1 Tube 0    allopurinoL (ZYLOPRIM) 100 mg tablet TAKE 1 TABLET BY MOUTH DAILY FOR GOUT 90 Tab 1    lidocaine (LIDOCAINE VISCOUS) 2 % solution TAKE 15 ML BY MOUTH EVERY 4 HOURS AS NEEDED FOR PAIN FOR MOUTH IRRITATION 100 mL 0    valACYclovir (VALTREX) 1 gram tablet Take 2 Tabs by mouth two (2) times a day. Indications: HERPES LABIALIS 4 Tab 5       Past History     Past Medical History:  Past Medical History:   Diagnosis Date    Gout     Hypertension        Past Surgical History:  History reviewed. No pertinent surgical history.     Family History:  Family History   Problem Relation Age of Onset    Heart Disease Mother     Heart Disease Maternal Grandfather     Stroke Maternal Grandfather     Hypertension Maternal Grandfather     Breast Cancer Paternal Grandmother     Heart Attack Paternal Grandfather        Social History:  Social History     Tobacco Use    Smoking status: Never Smoker    Smokeless tobacco: Never Used   Substance Use Topics    Alcohol use: No     Alcohol/week: 0.0 standard drinks    Drug use: No       Allergies:  No Known Allergies      Review of Systems   Review of Systems   Constitutional: Negative for chills and fever. HENT: Negative for facial swelling. Eyes: Negative for photophobia and visual disturbance. Respiratory: Negative for shortness of breath. Cardiovascular: Negative for chest pain. Gastrointestinal: Negative for abdominal pain, nausea and vomiting. Genitourinary: Negative for flank pain. Skin: Positive for wound. Negative for color change, pallor and rash. Neurological: Negative for dizziness, weakness, light-headedness and headaches. All other systems reviewed and are negative. Physical Exam   Physical Exam  Vitals signs and nursing note reviewed. Constitutional:       General: He is not in acute distress. Appearance: He is well-developed. Comments: Patient well-appearing in NAD   HENT:      Head: Normocephalic and atraumatic. Eyes:      Conjunctiva/sclera: Conjunctivae normal.   Cardiovascular:      Rate and Rhythm: Normal rate and regular rhythm. Heart sounds: Normal heart sounds. Pulmonary:      Effort: Pulmonary effort is normal. No respiratory distress. Breath sounds: Normal breath sounds. Abdominal:      General: Bowel sounds are normal. There is no distension. Palpations: Abdomen is soft. Musculoskeletal: Normal range of motion. Skin:     General: Skin is warm. Findings: No rash. Comments: Right second finger: 1 cm superficial linear laceration to dorsal aspect of distal tip of finger. Surrounding sensation intact. <3 sec cap refill. Bleeding controlled. No tendon involvement. Full AROM intact.    Neurological:      Mental Status: He is alert and oriented to person, place, and time. Psychiatric:         Behavior: Behavior normal.         Diagnostic Study Results     Labs -   No results found for this or any previous visit (from the past 12 hour(s)). Radiologic Studies -   No orders to display     CT Results  (Last 48 hours)    None        CXR Results  (Last 48 hours)    None          Medical Decision Making   I am the first provider for this patient. I reviewed the vital signs, available nursing notes, past medical history, past surgical history, family history and social history. Vital Signs-Reviewed the patient's vital signs. Patient Vitals for the past 12 hrs:   Temp Pulse Resp BP SpO2   08/20/20 1148 97.8 °F (36.6 °C) 86 16 117/79 95 %         Records Reviewed: Nursing Notes and Old Medical Records    Provider Notes (Medical Decision Making):   DDx: Laceration, Abrasion, Tenatus    37 yo M who presents with wound to right second finger that occurred 3 hours PTA. Not up-to-date on tetanus. Received tetanus in ED. Superficial laceration, does not cross joint with NVI intact. Dermabond applied to wound. Wound well-appearing. Pt non-toxic appearing, afebrile in NAD. Pt stable for prompt outpatient f/u with PCP in 1-2 days. ED Course:   Initial assessment performed. The patients presenting problems have been discussed, and they are in agreement with the care plan formulated and outlined with them. I have encouraged them to ask questions as they arise throughout their visit. Wound Closure by Adhesive    Date/Time: 8/20/2020 1:32 PM  Performed by: MAX Brown  Authorized by: MAX Brown     Consent:     Consent obtained:  Verbal    Consent given by:  Patient    Risks discussed:  Infection, pain, poor cosmetic result, poor wound healing and need for additional repair    Alternatives discussed: sutures. Laceration details:     Location: right second finger.     Length (cm):  1  Repair type:     Repair type:  Simple  Exploration:     Wound exploration: wound explored through full range of motion      Contaminated: no    Treatment:     Area cleansed with:  Betadine    Amount of cleaning:  Standard    Irrigation solution:  Sterile saline    Irrigation volume:  20    Irrigation method:  Pressure wash    Visualized foreign bodies/material removed: no    Skin repair:     Repair method:  Tissue adhesive  Approximation:     Approximation:  Close  Post-procedure details:     Dressing:  Open (no dressing)    Patient tolerance of procedure: Tolerated well, no immediate complications          Disposition:  1:33 PM  Discussed dx and treatment plan. Discussed importance of PCP follow up. All questions answered. Pt voiced they understood. Return if sx worsen. PLAN:  1. Current Discharge Medication List      CONTINUE these medications which have NOT CHANGED    Details   lisinopriL (PRINIVIL, ZESTRIL) 10 mg tablet TAKE 1 TABLET BY MOUTH ONCE DAILY FOR HIGH BLOOD PRESSURE  Qty: 30 Tab, Refills: 2    Associated Diagnoses: Essential hypertension with goal blood pressure less than 140/90      meloxicam (MOBIC) 15 mg tablet TAKE 1 TABLET BY MOUTH DAILY FOR GOUT  Qty: 90 Tab, Refills: 0    Associated Diagnoses: Acute gout of right foot, unspecified cause      lidocaine 5 % topical cream Apply  to affected area two (2) times daily as needed for Pain. Qty: 1 Tube, Refills: 0      allopurinoL (ZYLOPRIM) 100 mg tablet TAKE 1 TABLET BY MOUTH DAILY FOR GOUT  Qty: 90 Tab, Refills: 1    Associated Diagnoses: Idiopathic gout involving toe of right foot, unspecified chronicity      lidocaine (LIDOCAINE VISCOUS) 2 % solution TAKE 15 ML BY MOUTH EVERY 4 HOURS AS NEEDED FOR PAIN FOR MOUTH IRRITATION  Qty: 100 mL, Refills: 0      valACYclovir (VALTREX) 1 gram tablet Take 2 Tabs by mouth two (2) times a day. Indications: HERPES LABIALIS  Qty: 4 Tab, Refills: 5    Associated Diagnoses: Herpes labialis           2.    Follow-up Information Follow up With Specialties Details Why Contact Info    Yandel Barajas NP Nurse Practitioner Schedule an appointment as soon as possible for a visit in 1 day  [address] [place] [address]  [address] [address]  [phone]      [facility] EMERGENCY DEPT Emergency Medicine  If symptoms worsen [address] [facility] [address]  [phone]        Return to ED if worse     Diagnosis     Clinical Impression:   1. Laceration of right index finger without foreign body without damage to nail, initial encounter        Attestations:    MAX Bustillo    Please note that this dictation was completed with [employer] the [employer] voice recognition software. Quite often unanticipated grammatical, syntax, homophones, and other interpretive errors are inadvertently transcribed by the computer software. Please disregard these errors. Please excuse any errors that have escaped final proofreading. Thank you.

## 2020-08-20 NOTE — ED TRIAGE NOTES
Pt reports cut right pointer finger with piece of metal this am within the last several hours. States unknown last tetanus.  Bleeding controlled

## 2020-11-05 DIAGNOSIS — I10 ESSENTIAL HYPERTENSION WITH GOAL BLOOD PRESSURE LESS THAN 140/90: ICD-10-CM

## 2020-11-05 RX ORDER — LISINOPRIL 10 MG/1
TABLET ORAL
Qty: 30 TAB | Refills: 1 | Status: CANCELLED | OUTPATIENT
Start: 2020-11-05

## 2020-11-05 NOTE — TELEPHONE ENCOUNTER
PATIENT SCHEDULED FOR VIRTUAL VISIT 11/10. COMPLETELY OUT OF MEDICATION.   Requested Prescriptions     Pending Prescriptions Disp Refills    lisinopriL (PRINIVIL, ZESTRIL) 10 mg tablet 30 Tab 1

## 2020-11-08 ENCOUNTER — HOSPITAL ENCOUNTER (EMERGENCY)
Age: 35
Discharge: HOME | End: 2020-11-08
Attending: EMERGENCY MEDICINE
Payer: COMMERCIAL

## 2020-11-08 VITALS
OXYGEN SATURATION: 98 % | DIASTOLIC BLOOD PRESSURE: 93 MMHG | TEMPERATURE: 98.2 F | RESPIRATION RATE: 16 BRPM | SYSTOLIC BLOOD PRESSURE: 131 MMHG | HEART RATE: 82 BPM | WEIGHT: 200 LBS | HEIGHT: 71 IN | BODY MASS INDEX: 28 KG/M2

## 2020-11-08 DIAGNOSIS — Z76.0 MEDICATION REFILL: Primary | ICD-10-CM

## 2020-11-08 DIAGNOSIS — I10 ESSENTIAL HYPERTENSION WITH GOAL BLOOD PRESSURE LESS THAN 140/90: ICD-10-CM

## 2020-11-08 PROCEDURE — 99281 EMR DPT VST MAYX REQ PHY/QHP: CPT

## 2020-11-08 RX ORDER — LISINOPRIL 10 MG/1
10 TABLET ORAL DAILY
Qty: 30 TAB | Refills: 0 | Status: SHIPPED | OUTPATIENT
Start: 2020-11-08 | End: 2020-11-09 | Stop reason: SDUPTHER

## 2020-11-08 NOTE — ED TRIAGE NOTES
Patient arrived through triage with c/o needing medication refill. Patient states he takes 10mg lisinopril once a day.

## 2020-11-08 NOTE — DISCHARGE INSTRUCTIONS
Patient Education        DASH Diet: Care Instructions  Your Care Instructions     The DASH diet is an eating plan that can help lower your blood pressure. DASH stands for Dietary Approaches to Stop Hypertension. Hypertension is high blood pressure. The DASH diet focuses on eating foods that are high in calcium, potassium, and magnesium. These nutrients can lower blood pressure. The foods that are highest in these nutrients are fruits, vegetables, low-fat dairy products, nuts, seeds, and legumes. But taking calcium, potassium, and magnesium supplements instead of eating foods that are high in those nutrients does not have the same effect. The DASH diet also includes whole grains, fish, and poultry. The DASH diet is one of several lifestyle changes your doctor may recommend to lower your high blood pressure. Your doctor may also want you to decrease the amount of sodium in your diet. Lowering sodium while following the DASH diet can lower blood pressure even further than just the DASH diet alone. Follow-up care is a key part of your treatment and safety. Be sure to make and go to all appointments, and call your doctor if you are having problems. It's also a good idea to know your test results and keep a list of the medicines you take. How can you care for yourself at home? Following the DASH diet  · Eat 4 to 5 servings of fruit each day. A serving is 1 medium-sized piece of fruit, ½ cup chopped or canned fruit, 1/4 cup dried fruit, or 4 ounces (½ cup) of fruit juice. Choose fruit more often than fruit juice. · Eat 4 to 5 servings of vegetables each day. A serving is 1 cup of lettuce or raw leafy vegetables, ½ cup of chopped or cooked vegetables, or 4 ounces (½ cup) of vegetable juice. Choose vegetables more often than vegetable juice. · Get 2 to 3 servings of low-fat and fat-free dairy each day. A serving is 8 ounces of milk, 1 cup of yogurt, or 1 ½ ounces of cheese. · Eat 6 to 8 servings of grains each day.  A serving is 1 slice of bread, 1 ounce of dry cereal, or ½ cup of cooked rice, pasta, or cooked cereal. Try to choose whole-grain products as much as possible. · Limit lean meat, poultry, and fish to 2 servings each day. A serving is 3 ounces, about the size of a deck of cards. · Eat 4 to 5 servings of nuts, seeds, and legumes (cooked dried beans, lentils, and split peas) each week. A serving is 1/3 cup of nuts, 2 tablespoons of seeds, or ½ cup of cooked beans or peas. · Limit fats and oils to 2 to 3 servings each day. A serving is 1 teaspoon of vegetable oil or 2 tablespoons of salad dressing. · Limit sweets and added sugars to 5 servings or less a week. A serving is 1 tablespoon jelly or jam, ½ cup sorbet, or 1 cup of lemonade. · Eat less than 2,300 milligrams (mg) of sodium a day. If you limit your sodium to 1,500 mg a day, you can lower your blood pressure even more. Tips for success  · Start small. Do not try to make dramatic changes to your diet all at once. You might feel that you are missing out on your favorite foods and then be more likely to not follow the plan. Make small changes, and stick with them. Once those changes become habit, add a few more changes. · Try some of the following:  ? Make it a goal to eat a fruit or vegetable at every meal and at snacks. This will make it easy to get the recommended amount of fruits and vegetables each day. ? Try yogurt topped with fruit and nuts for a snack or healthy dessert. ? Add lettuce, tomato, cucumber, and onion to sandwiches. ? Combine a ready-made pizza crust with low-fat mozzarella cheese and lots of vegetable toppings. Try using tomatoes, squash, spinach, broccoli, carrots, cauliflower, and onions. ? Have a variety of cut-up vegetables with a low-fat dip as an appetizer instead of chips and dip. ? Sprinkle sunflower seeds or chopped almonds over salads. Or try adding chopped walnuts or almonds to cooked vegetables.   ? Try some vegetarian meals using beans and peas. Add garbanzo or kidney beans to salads. Make burritos and tacos with mashed perez beans or black beans. Where can you learn more? Go to http://www.hayes.com/  Enter H967 in the search box to learn more about \"DASH Diet: Care Instructions. \"  Current as of: December 16, 2019               Content Version: 12.6  © 9622-6804 [employer]. Care instructions adapted under license by [employer] (which disclaims liability or warranty for this information). If you have questions about a medical condition or this instruction, always ask your healthcare professional. [address] any warranty or liability for your use of this information.

## 2020-11-09 DIAGNOSIS — I10 ESSENTIAL HYPERTENSION WITH GOAL BLOOD PRESSURE LESS THAN 140/90: ICD-10-CM

## 2020-11-09 RX ORDER — LISINOPRIL 10 MG/1
10 TABLET ORAL DAILY
Qty: 30 TAB | Refills: 0 | Status: SHIPPED | OUTPATIENT
Start: 2020-11-09 | End: 2020-11-15

## 2020-11-11 DIAGNOSIS — I10 ESSENTIAL HYPERTENSION WITH GOAL BLOOD PRESSURE LESS THAN 140/90: ICD-10-CM

## 2020-11-12 ENCOUNTER — VIRTUAL VISIT (OUTPATIENT)
Dept: FAMILY MEDICINE CLINIC | Age: 35
End: 2020-11-12
Payer: COMMERCIAL

## 2020-11-12 DIAGNOSIS — I10 ESSENTIAL HYPERTENSION WITH GOAL BLOOD PRESSURE LESS THAN 140/90: Primary | ICD-10-CM

## 2020-11-12 PROCEDURE — 99213 OFFICE O/P EST LOW 20 MIN: CPT | Performed by: NURSE PRACTITIONER

## 2020-11-12 NOTE — PROGRESS NOTES
Yoanna Medina is a 38 y.o. male who was seen by synchronous (real-time) audio-video technology on 11/12/2020 for Hypertension    Assessment & Plan:   Diagnoses and all orders for this visit:    1. Essential hypertension with goal blood pressure less than 140/90            Subjective: The patient presents for an Audio-visual teleconference appointment for hypertension follow-up care. He feels well today,  He is negative for CP, palpitation or LE edema. He is compliant with his medication treatment plan. He also carries a medical history for gout. He was previously prescribed Allopurinol but notes he has not taken the medication in a \"long time\". He denies any recent gout attacks. He is negative for cough, fever, loss of smell/taste or GI upset. Prior to Admission medications    Medication Sig Start Date End Date Taking? Authorizing Provider   lisinopriL (PRINIVIL, ZESTRIL) 10 mg tablet TAKE 1 TABLET BY MOUTH DAILY 11/15/20   Maria G Boehringer, NP     Patient Active Problem List   Diagnosis Code    Essential hypertension with goal blood pressure less than 140/90 I10    Idiopathic gout involving toe of right foot M10.071    Overweight (BMI 25.0-29. 9) E66.3    Herpes labialis B00.1    H/O parotidectomy Z90.49     Patient Active Problem List    Diagnosis Date Noted    H/O parotidectomy 06/17/2019    Herpes labialis 07/30/2018    Overweight (BMI 25.0-29.9) 02/12/2018    Essential hypertension with goal blood pressure less than 140/90 07/01/2016    Idiopathic gout involving toe of right foot 07/01/2016     Current Outpatient Medications   Medication Sig Dispense Refill    lisinopriL (PRINIVIL, ZESTRIL) 10 mg tablet TAKE 1 TABLET BY MOUTH DAILY 90 Tab 0     No Known Allergies  Past Medical History:   Diagnosis Date    Gout     Hypertension        ROS    Constitutional: No apparent distress noted  General- negative for fever, chills or fatigue  Eyes- negative visual changes  CV- denies chest pain, palpitation  Pul: negative cough or SOB  GI: negative nausea, flank pain, diarrhea, constipation  Urinary:- No dysuria or polyuria  MS- negative myalgia, negative joint pain  Neuro- negative headache, dizziness or weakness  Skin- negative for rashes or lesions. Psych- denies any anxiety or depression    Objective:   No flowsheet data found. [INSTRUCTIONS:  \"[x]\" Indicates a positive item  \"[]\" Indicates a negative item  -- DELETE ALL ITEMS NOT EXAMINED]    Constitutional: [x] Appears well-developed and well-nourished [x] No apparent distress      [] Abnormal -     Mental status: [x] Alert and awake  [x] Oriented to person/place/time [x] Able to follow commands    [] Abnormal -     Eyes:   EOM    [x]  Normal    [] Abnormal -   Sclera  [x]  Normal    [] Abnormal -          Discharge [x]  None visible   [] Abnormal -     HENT: [x] Normocephalic, atraumatic  [] Abnormal -   [x] Mouth/Throat: Mucous membranes are moist    External Ears [x] Normal  [] Abnormal -    Neck: [x] No visualized mass [] Abnormal -     Pulmonary/Chest: [x] Respiratory effort normal   [x] No visualized signs of difficulty breathing or respiratory distress        [] Abnormal -      Musculoskeletal:   [x] Normal gait with no signs of ataxia         [x] Normal range of motion of neck        [] Abnormal -     Neurological:        [x] No Facial Asymmetry (Cranial nerve 7 motor function) (limited exam due to video visit)          [x] No gaze palsy        [] Abnormal -          Skin:        [x] No significant exanthematous lesions or discoloration noted on facial skin         [] Abnormal -            Psychiatric:       [x] Normal Affect [] Abnormal -        [x] No Hallucinations    Other pertinent observable physical exam findings:-        We discussed the expected course, resolution and complications of the diagnosis(es) in detail. Medication risks, benefits, costs, interactions, and alternatives were discussed as indicated.   I advised him to contact the office if his condition worsens, changes or fails to improve as anticipated. He expressed understanding with the diagnosis(es) and plan. Patrizia Enriquez, who was evaluated through a patient-initiated, synchronous (real-time) audio-video encounter, and/or his healthcare decision maker, is aware that it is a billable service, with coverage as determined by his insurance carrier. He provided verbal consent to proceed: Yes, and patient identification was verified. It was conducted pursuant to the emergency declaration under the [address] [place] [address], [address] [place] [address] authority and the [employer] and [employer] Act. A caregiver was present when appropriate. Ability to conduct physical exam was limited. I was at home. The patient was at home.       Katherine Ferguson NP

## 2020-11-15 RX ORDER — LISINOPRIL 10 MG/1
TABLET ORAL
Qty: 90 TAB | Refills: 0 | Status: SHIPPED | OUTPATIENT
Start: 2020-11-15 | End: 2020-12-08

## 2021-01-29 ENCOUNTER — HOSPITAL ENCOUNTER (OUTPATIENT)
Dept: LAB | Age: 36
Discharge: HOME | End: 2021-01-29
Payer: COMMERCIAL

## 2021-01-29 DIAGNOSIS — I10 ESSENTIAL HYPERTENSION WITH GOAL BLOOD PRESSURE LESS THAN 140/90: ICD-10-CM

## 2021-01-29 LAB
ALBUMIN SERPL-MCNC: 4.1 G/DL (ref 3.4–5)
ALBUMIN/GLOB SERPL: 1.3 {RATIO} (ref 0.8–1.7)
ALP SERPL-CCNC: 64 U/L (ref 45–117)
ALT SERPL-CCNC: 37 U/L (ref 16–61)
ANION GAP SERPL CALC-SCNC: 3 MMOL/L (ref 3–18)
AST SERPL-CCNC: 21 U/L (ref 10–38)
BASOPHILS # BLD: 0 K/UL (ref 0–0.1)
BASOPHILS NFR BLD: 0 % (ref 0–2)
BILIRUB SERPL-MCNC: 0.6 MG/DL (ref 0.2–1)
BUN SERPL-MCNC: 16 MG/DL (ref 7–18)
BUN/CREAT SERPL: 16 (ref 12–20)
CALCIUM SERPL-MCNC: 9.4 MG/DL (ref 8.5–10.1)
CHLORIDE SERPL-SCNC: 105 MMOL/L (ref 100–111)
CHOLEST SERPL-MCNC: 176 MG/DL
CO2 SERPL-SCNC: 32 MMOL/L (ref 21–32)
CREAT SERPL-MCNC: 1.02 MG/DL (ref 0.6–1.3)
DIFFERENTIAL METHOD BLD: ABNORMAL
EOSINOPHIL # BLD: 0.2 K/UL (ref 0–0.4)
EOSINOPHIL NFR BLD: 2 % (ref 0–5)
ERYTHROCYTE [DISTWIDTH] IN BLOOD BY AUTOMATED COUNT: 14.9 % (ref 11.6–14.5)
GLOBULIN SER CALC-MCNC: 3.2 G/DL (ref 2–4)
GLUCOSE SERPL-MCNC: 75 MG/DL (ref 74–99)
HCT VFR BLD AUTO: 43.2 % (ref 36–48)
HDLC SERPL-MCNC: 58 MG/DL (ref 40–60)
HDLC SERPL: 3 {RATIO} (ref 0–5)
HGB BLD-MCNC: 14.3 G/DL (ref 13–16)
LDLC SERPL CALC-MCNC: 107 MG/DL (ref 0–100)
LIPID PROFILE,FLP: ABNORMAL
LYMPHOCYTES # BLD: 1.9 K/UL (ref 0.9–3.6)
LYMPHOCYTES NFR BLD: 25 % (ref 21–52)
MCH RBC QN AUTO: 24.6 PG (ref 24–34)
MCHC RBC AUTO-ENTMCNC: 33.1 G/DL (ref 31–37)
MCV RBC AUTO: 74.4 FL (ref 74–97)
MONOCYTES # BLD: 0.7 K/UL (ref 0.05–1.2)
MONOCYTES NFR BLD: 9 % (ref 3–10)
NEUTS SEG # BLD: 4.8 K/UL (ref 1.8–8)
NEUTS SEG NFR BLD: 64 % (ref 40–73)
PLATELET # BLD AUTO: 230 K/UL (ref 135–420)
PMV BLD AUTO: 8.8 FL (ref 9.2–11.8)
POTASSIUM SERPL-SCNC: 4.6 MMOL/L (ref 3.5–5.5)
PROT SERPL-MCNC: 7.3 G/DL (ref 6.4–8.2)
RBC # BLD AUTO: 5.81 M/UL (ref 4.7–5.5)
SODIUM SERPL-SCNC: 140 MMOL/L (ref 136–145)
TRIGL SERPL-MCNC: 55 MG/DL (ref ?–150)
VLDLC SERPL CALC-MCNC: 11 MG/DL
WBC # BLD AUTO: 7.6 K/UL (ref 4.6–13.2)

## 2021-01-29 PROCEDURE — 36415 COLL VENOUS BLD VENIPUNCTURE: CPT

## 2021-01-29 PROCEDURE — 85025 COMPLETE CBC W/AUTO DIFF WBC: CPT

## 2021-01-29 PROCEDURE — 80061 LIPID PANEL: CPT

## 2021-01-29 PROCEDURE — 80053 COMPREHEN METABOLIC PANEL: CPT

## 2021-02-04 ENCOUNTER — VIRTUAL VISIT (OUTPATIENT)
Dept: FAMILY MEDICINE CLINIC | Age: 36
End: 2021-02-04
Payer: COMMERCIAL

## 2021-02-04 DIAGNOSIS — I10 ESSENTIAL HYPERTENSION WITH GOAL BLOOD PRESSURE LESS THAN 140/90: Primary | ICD-10-CM

## 2021-02-04 PROCEDURE — 99213 OFFICE O/P EST LOW 20 MIN: CPT | Performed by: NURSE PRACTITIONER

## 2021-02-04 RX ORDER — LISINOPRIL 10 MG/1
TABLET ORAL
Qty: 30 TAB | Refills: 3 | Status: SHIPPED | OUTPATIENT
Start: 2021-02-04 | End: 2021-06-27

## 2021-02-04 NOTE — PROGRESS NOTES
Tutu Burns presents today for   Chief Complaint   Patient presents with    Labs     follow-up    Hypertension       Virtual/telephone visit    Depression Screening:  3 most recent PHQ Screens 2/4/2021   Little interest or pleasure in doing things Not at all   Feeling down, depressed, irritable, or hopeless Not at all   Total Score PHQ 2 0       Learning Assessment:  Learning Assessment 7/1/2016   PRIMARY LEARNER Patient   HIGHEST LEVEL OF EDUCATION - PRIMARY LEARNER  GRADUATED HIGH SCHOOL OR GED   BARRIERS PRIMARY LEARNER NONE   PRIMARY LANGUAGE ENGLISH   LEARNER PREFERENCE PRIMARY LISTENING   ANSWERED BY patient    RELATIONSHIP SELF       Health Maintenance reviewed and discussed and ordered per Provider. Health Maintenance Due   Topic Date Due    Flu Vaccine (1) 09/01/2020   . Coordination of Care:  1. Have you been to the ER, urgent care clinic since your last visit? Hospitalized since your last visit? no    2. Have you seen or consulted any other health care providers outside of the [address] [place] [address] since your last visit? Include any pap smears or colon screening.  no

## 2021-08-29 ENCOUNTER — APPOINTMENT (OUTPATIENT)
Dept: GENERAL RADIOLOGY | Age: 36
End: 2021-08-29
Attending: PHYSICIAN ASSISTANT
Payer: COMMERCIAL

## 2021-08-29 ENCOUNTER — HOSPITAL ENCOUNTER (EMERGENCY)
Age: 36
Discharge: HOME | End: 2021-08-29
Attending: EMERGENCY MEDICINE | Admitting: EMERGENCY MEDICINE
Payer: COMMERCIAL

## 2021-08-29 VITALS
HEIGHT: 71 IN | HEART RATE: 83 BPM | BODY MASS INDEX: 28 KG/M2 | DIASTOLIC BLOOD PRESSURE: 87 MMHG | RESPIRATION RATE: 18 BRPM | OXYGEN SATURATION: 100 % | SYSTOLIC BLOOD PRESSURE: 122 MMHG | TEMPERATURE: 97.5 F | WEIGHT: 200 LBS

## 2021-08-29 DIAGNOSIS — M25.512 PAIN IN JOINT OF LEFT SHOULDER: Primary | ICD-10-CM

## 2021-08-29 PROCEDURE — 99281 EMR DPT VST MAYX REQ PHY/QHP: CPT

## 2021-08-29 PROCEDURE — 73030 X-RAY EXAM OF SHOULDER: CPT

## 2021-08-29 RX ORDER — NAPROXEN 500 MG/1
500 TABLET ORAL 2 TIMES DAILY WITH MEALS
Qty: 20 TABLET | Refills: 0 | Status: SHIPPED | OUTPATIENT
Start: 2021-08-29 | End: 2021-09-08

## 2021-08-29 RX ORDER — LIDOCAINE 50 MG/G
PATCH TOPICAL
Qty: 30 EACH | Refills: 0 | Status: SHIPPED | OUTPATIENT
Start: 2021-08-29

## 2021-08-29 NOTE — Clinical Note
[facility] EMERGENCY DEPT  [id number] [facility] [id number] [phone]    Work/School Note    Date: 8/29/2021    To Whom It May concern:    Herman Manriquez was seen and treated today in the emergency room by the following provider(s):  Attending Provider: Duglas Moser MD  Physician Assistant: Natali Valverde. Herman Manriquez is excused from work/school on 08/29/21 and 08/30/21. He is medically clear to return to work/school on 8/31/2021.        Sincerely,          MAX Jacobs

## 2021-08-29 NOTE — DISCHARGE INSTRUCTIONS
Take medication as prescribed. Follow-up with the orthopedic physician within 2 days for reassessment. Bring the results from this visit with you for their review. Return to the ED immediately for any new, worsening, or persistent symptoms.

## 2021-08-29 NOTE — ED TRIAGE NOTES
Patient reports intermittent left shoulder throbbing since last night. No known injury or previous injuries.

## 2021-08-29 NOTE — ED PROVIDER NOTES
EMERGENCY DEPARTMENT HISTORY AND PHYSICAL EXAM    2:08 PM      Date: 8/29/2021  Patient Name: Almaz Jin    History of Presenting Illness     Chief Complaint   Patient presents with    Shoulder Pain     History Provided By: Patient    Additional History (Context): Almaz Jin is a 43 y.o. male with hx of HTN, gout who presents with complaint of left shoulder pain x 1 week. Notes pain is worse with movement. Denies injury or trauma, but notes he lifts heavy at work. Has taken Aleve without relief of symptoms. Denies fever or chills, chest pain, shortness of breath, numbness or tingling, joint swelling or discoloration, extremity weakness. PCP: Odilia Hudson NP    Current Outpatient Medications   Medication Sig Dispense Refill    naproxen (Naprosyn) 500 mg tablet Take 1 Tablet by mouth two (2) times daily (with meals) for 10 days. 20 Tablet 0    lidocaine (Lidoderm) 5 % Apply patch to the affected area for 12 hours a day and remove for 12 hours a day. 30 Each 0    lisinopriL (PRINIVIL, ZESTRIL) 10 mg tablet TAKE 1 TABLET BY MOUTH ONCE DAILY FOR HIGH BLOOD PRESSURE 30 Tablet 1       Past History     Past Medical History:  Past Medical History:   Diagnosis Date    Gout     Hypertension        Past Surgical History:  No past surgical history on file. Family History:  Family History   Problem Relation Age of Onset    Heart Disease Mother     Heart Disease Maternal Grandfather     Stroke Maternal Grandfather     Hypertension Maternal Grandfather     Breast Cancer Paternal Grandmother     Heart Attack Paternal Grandfather        Social History:  Social History     Tobacco Use    Smoking status: Never Smoker    Smokeless tobacco: Never Used   Substance Use Topics    Alcohol use: No     Alcohol/week: 0.0 standard drinks    Drug use: No       Allergies:  No Known Allergies      Review of Systems       Review of Systems   Constitutional: Negative for chills and fever.    Respiratory: Negative for shortness of breath. Cardiovascular: Negative for chest pain. Gastrointestinal: Negative for abdominal pain, nausea and vomiting. Musculoskeletal: Positive for arthralgias and myalgias. Skin: Negative for rash. Neurological: Negative for weakness. All other systems reviewed and are negative. Physical Exam     Visit Vitals  /87   Pulse 83   Temp 97.5 °F (36.4 °C)   Resp 18   Ht 5' 11\" (1.803 m)   Wt 90.7 kg (200 lb)   SpO2 100%   BMI 27.89 kg/m²         Physical Exam  Vitals and nursing note reviewed. Constitutional:       General: He is not in acute distress. Appearance: Normal appearance. He is well-developed. He is not ill-appearing, toxic-appearing or diaphoretic. HENT:      Head: Normocephalic and atraumatic. Cardiovascular:      Rate and Rhythm: Normal rate and regular rhythm. Heart sounds: Normal heart sounds. No murmur heard. No friction rub. No gallop. Pulmonary:      Effort: Pulmonary effort is normal. No respiratory distress. Breath sounds: Normal breath sounds. No wheezing or rales. Musculoskeletal:         General: Normal range of motion. Left shoulder: No swelling, deformity, tenderness, bony tenderness or crepitus. Normal range of motion. Normal strength. Normal pulse. Cervical back: Normal range of motion and neck supple. Comments: Pt notes pain with range of motion of joint, no erythema/edema/discoloration,  strength 5/5, radial pulse 2+   Skin:     General: Skin is warm. Findings: No rash. Neurological:      Mental Status: He is alert. Diagnostic Study Results     Labs -  No results found for this or any previous visit (from the past 12 hour(s)). Radiologic Studies -   XR SHOULDER LT AP/LAT MIN 2 V   Final Result      1. Unremarkable exam            Medical Decision Making   I am the first provider for this patient.     I reviewed the vital signs, available nursing notes, past medical history, past surgical history, family history and social history. Vital Signs-Reviewed the patient's vital signs. Records Reviewed: Nursing Notes and Old Medical Records (Time of Review: 2:08 PM)    ED Course: Progress Notes, Reevaluation, and Consults:  2:08 PM  Reviewed plan with patient. Discussed need for close outpatient follow-up this week for reassessment. Discussed strict return precautions, including chest pain, shortness of breath, weakness, or any other medical concerns. Pt in agreement with plan. Provider Notes (Medical Decision Making): 58-year-old male who presents to the ED due to left shoulder pain x1 week. No injury or trauma. No chest pain, dyspnea. No evidence of cellulitis, septic joint, fracture. Stable for discharge with symptomatic management, close outpatient follow-up for further assessment. Strict return precautions provided. Diagnosis     Clinical Impression:   1. Pain in joint of left shoulder        Disposition: home     Follow-up Information     Follow up With Specialties Details Why [address] [facility] EMERGENCY DEPT Emergency Medicine  If symptoms worsen [address] [place] [address] [address] [facility]    Jorje Leon NP Nurse Practitioner Schedule an appointment as soon as possible for a visit   [address] [place] [id number]  [address] [place] [address] Cesar  Schedule an appointment as soon as possible for a visit   Bridgette  [phone]           Discharge Medication List as of 8/29/2021  1:31 PM      START taking these medications    Details   naproxen (Naprosyn) 500 mg tablet Take 1 Tablet by mouth two (2) times daily (with meals) for 10 days. , Normal, Disp-20 Tablet, R-0      lidocaine (Lidoderm) 5 % Apply patch to the affected area for 12 hours a day and remove for 12 hours a day., Normal, Disp-30 Each, R-0         CONTINUE these medications which have NOT CHANGED    Details   lisinopriL (PRINIVIL, ZESTRIL) 10 mg tablet TAKE 1 TABLET BY MOUTH ONCE DAILY FOR HIGH BLOOD PRESSURE, Normal, Disp-30 Tablet, R-1             Dictation disclaimer:  Please note that this dictation was completed with [email or web address], the computer voice recognition software. Quite often unanticipated grammatical, syntax, homophones, and other interpretive errors are inadvertently transcribed by the computer software. Please disregard these errors. Please excuse any errors that have escaped final proofreading.

## 2021-09-09 DIAGNOSIS — I10 ESSENTIAL HYPERTENSION WITH GOAL BLOOD PRESSURE LESS THAN 140/90: ICD-10-CM

## 2021-09-12 RX ORDER — LISINOPRIL 10 MG/1
TABLET ORAL
Qty: 30 TABLET | Refills: 0 | Status: SHIPPED | OUTPATIENT
Start: 2021-09-12 | End: 2021-10-13

## 2021-10-12 DIAGNOSIS — I10 ESSENTIAL HYPERTENSION WITH GOAL BLOOD PRESSURE LESS THAN 140/90: ICD-10-CM

## 2021-10-13 RX ORDER — LISINOPRIL 10 MG/1
TABLET ORAL
Qty: 30 TABLET | Refills: 0 | Status: SHIPPED | OUTPATIENT
Start: 2021-10-13 | End: 2021-11-28

## 2021-11-19 DIAGNOSIS — I10 ESSENTIAL HYPERTENSION WITH GOAL BLOOD PRESSURE LESS THAN 140/90: ICD-10-CM

## 2021-11-26 NOTE — TELEPHONE ENCOUNTER
Pt called to check on his refill for Lisinopril and states that he only takes 1 pill daily. Please advise. If RX is going to be refilled.  Thank you

## 2021-11-28 RX ORDER — LISINOPRIL 10 MG/1
TABLET ORAL
Qty: 30 TABLET | Refills: 0 | Status: SHIPPED | OUTPATIENT
Start: 2021-11-28 | End: 2022-01-11 | Stop reason: SDUPTHER

## 2022-01-11 DIAGNOSIS — I10 ESSENTIAL HYPERTENSION WITH GOAL BLOOD PRESSURE LESS THAN 140/90: ICD-10-CM

## 2022-01-12 RX ORDER — LISINOPRIL 10 MG/1
TABLET ORAL
Qty: 30 TABLET | Refills: 0 | Status: SHIPPED | OUTPATIENT
Start: 2022-01-12 | End: 2022-03-07

## 2022-02-10 ENCOUNTER — OFFICE VISIT (OUTPATIENT)
Dept: FAMILY MEDICINE CLINIC | Age: 37
End: 2022-02-10
Payer: COMMERCIAL

## 2022-02-10 ENCOUNTER — TELEPHONE (OUTPATIENT)
Dept: FAMILY MEDICINE CLINIC | Age: 37
End: 2022-02-10

## 2022-02-10 VITALS
HEIGHT: 71 IN | WEIGHT: 205 LBS | OXYGEN SATURATION: 97 % | HEART RATE: 82 BPM | DIASTOLIC BLOOD PRESSURE: 70 MMHG | TEMPERATURE: 98.4 F | SYSTOLIC BLOOD PRESSURE: 116 MMHG | RESPIRATION RATE: 16 BRPM | BODY MASS INDEX: 28.7 KG/M2

## 2022-02-10 DIAGNOSIS — I10 ESSENTIAL HYPERTENSION WITH GOAL BLOOD PRESSURE LESS THAN 140/90: Primary | ICD-10-CM

## 2022-02-10 DIAGNOSIS — K12.0 APHTHOUS STOMATITIS: ICD-10-CM

## 2022-02-10 PROCEDURE — 99213 OFFICE O/P EST LOW 20 MIN: CPT | Performed by: NURSE PRACTITIONER

## 2022-02-10 NOTE — PROGRESS NOTES
Austen Tami presents today for   Chief Complaint   Patient presents with    Hypertension     follow-up       Is someone accompanying this pt? no    Is the patient using any DME equipment during OV? no    Depression Screening:  3 most recent PHQ Screens 2/4/2021   Little interest or pleasure in doing things Not at all   Feeling down, depressed, irritable, or hopeless Not at all   Total Score PHQ 2 0       Learning Assessment:  Learning Assessment 7/1/2016   PRIMARY LEARNER Patient   HIGHEST LEVEL OF EDUCATION - PRIMARY LEARNER  GRADUATED HIGH SCHOOL OR GED   BARRIERS PRIMARY LEARNER NONE   PRIMARY LANGUAGE ENGLISH   LEARNER PREFERENCE PRIMARY LISTENING   ANSWERED BY patient    RELATIONSHIP SELF       Health Maintenance reviewed and discussed and ordered per Provider. Health Maintenance Due   Topic Date Due    Hepatitis C Screening  Never done    COVID-19 Vaccine (1) Never done    Flu Vaccine (1) 09/01/2021    Depression Screen  02/04/2022   . Coordination of Care:  1. Have you been to the ER, urgent care clinic since your last visit? Hospitalized since your last visit? no    2. Have you seen or consulted any other health care providers outside of the [address] [place] [address] since your last visit? Include any pap smears or colon screening.  no

## 2022-02-10 NOTE — PROGRESS NOTES
Thomas Cook is a 43 y.o. male presenting today for Hypertension (follow-up)    Hypertension   Pertinent negatives include no chest pain, no palpitations, no malaise/fatigue, no headaches, no dizziness, no nausea and no vomiting.   :  Thomas Cook presents to the office today for hypertension, hyperlipidemia and gout follow-up care. Hypertension-his blood pressure today is 116/70. He is prescribed lisinopril daily and he is compliant with the treatment plan. He denies any chest pain, palpitation, headache lower extremity edema. He is negative for any side effects or adverse reaction to the medications. Gout-no recent gout flareups. Hyperlipidemia-last lipid panel done January 29, 2021. His cholesterol is 176, triglycerides 55, HDL 58 and . Patient notes that he has modified his diet and if he eats fried foods he uses the air fryer. Review of Systems   Constitutional: Negative for malaise/fatigue. Respiratory: Negative for cough and sputum production. Cardiovascular: Negative for chest pain and palpitations. Gastrointestinal: Negative for abdominal pain, nausea and vomiting. Musculoskeletal: Negative for back pain and myalgias. Neurological: Negative for dizziness and headaches. No Known Allergies    Current Outpatient Medications   Medication Sig Dispense Refill    aluminum-magnesium hydroxide 200-200 mg/5 mL susp 30 mL, diphenhydrAMINE 12.5 mg/5 mL liqd 75 mg, lidocaine 2 % soln 30 mL, nystatin 100,000 unit/mL susp 3,000,000 Units oral suspension (compounded) Take 5 mL by mouth four (4) times daily. 240 mL 2    lisinopriL (PRINIVIL, ZESTRIL) 10 mg tablet TAKE 1 TABLET BY MOUTH ONCE DAILY FOR HIGH BLOOD PRESSURE 30 Tablet 0    lidocaine (Lidoderm) 5 % Apply patch to the affected area for 12 hours a day and remove for 12 hours a day.  (Patient not taking: Reported on 2/10/2022) 30 Each 0       Past Medical History:   Diagnosis Date    Gout     Hypertension        History reviewed. No pertinent surgical history. Social History     Socioeconomic History    Marital status:      Spouse name: Not on file    Number of children: Not on file    Years of education: Not on file    Highest education level: Not on file   Occupational History    Not on file   Tobacco Use    Smoking status: Never Smoker    Smokeless tobacco: Never Used   Substance and Sexual Activity    Alcohol use: No     Alcohol/week: 0.0 standard drinks    Drug use: No    Sexual activity: Yes     Partners: Female   Other Topics Concern    Not on file   Social History Narrative    Not on file     Social Determinants of Health     Financial Resource Strain:     Difficulty of Paying Living Expenses: Not on file   Food Insecurity:     Worried About Running Out of Food in the Last Year: Not on file    Mtailda of Food in the Last Year: Not on file   Transportation Needs:     Lack of Transportation (Medical): Not on file    Lack of Transportation (Non-Medical):  Not on file   Physical Activity:     Days of Exercise per Week: Not on file    Minutes of Exercise per Session: Not on file   Stress:     Feeling of Stress : Not on file   Social Connections:     Frequency of Communication with Friends and Family: Not on file    Frequency of Social Gatherings with Friends and Family: Not on file    Attends Sikh Services: Not on file    Active Member of [address] [place] [address] or Organizations: Not on file    Attends Club or Organization Meetings: Not on file    Marital Status: Not on file   Intimate Partner Violence:     Fear of Current or Ex-Partner: Not on file    Emotionally Abused: Not on file    Physically Abused: Not on file    Sexually Abused: Not on file   Housing Stability:     Unable to Pay for Housing in the Last Year: Not on file    Number of [place] in the Last Year: Not on file    Unstable Housing in the Last Year: Not on file       Patient does not have an advanced directive on file    Vitals: 02/10/22 1313   BP: 116/70   Pulse: 82   Resp: 16   Temp: 98.4 °F (36.9 °C)   TempSrc: Oral   SpO2: 97%   Weight: 205 lb (93 kg)   Height: 5' 11\" (1.803 m)   PainSc:   0 - No pain       Physical Exam  Vitals and nursing note reviewed. Constitutional:       Appearance: Normal appearance. HENT:      Head: Normocephalic. Mouth/Throat:     Cardiovascular:      Rate and Rhythm: Normal rate and regular rhythm. Heart sounds: Normal heart sounds. Pulmonary:      Effort: Pulmonary effort is normal.      Breath sounds: Normal breath sounds. Abdominal:      General: Bowel sounds are normal.      Palpations: Abdomen is soft. Musculoskeletal:         General: No swelling or tenderness. Cervical back: Neck supple. No rigidity. Skin:     General: Skin is warm and dry. Neurological:      General: No focal deficit present. Mental Status: He is alert. No visits with results within 3 Month(s) from this visit. Latest known visit with results is:   Hospital Outpatient Visit on 01/29/2021   Component Date Value Ref Range Status    WBC 01/29/2021 7.6  4.6 - 13.2 K/uL Final    RBC 01/29/2021 5.81* 4.70 - 5.50 M/uL Final    HGB 01/29/2021 14.3  13.0 - 16.0 g/dL Final    HCT 01/29/2021 43.2  36.0 - 48.0 % Final    MCV 01/29/2021 74.4  74.0 - 97.0 FL Final    MCH 01/29/2021 24.6  24.0 - 34.0 PG Final    MCHC 01/29/2021 33.1  31.0 - 37.0 g/dL Final    RDW 01/29/2021 14.9* 11.6 - 14.5 % Final    PLATELET 81/39/8546 777  135 - 420 K/uL Final    MPV 01/29/2021 8.8* 9.2 - 11.8 FL Final    NEUTROPHILS 01/29/2021 64  40 - 73 % Final    LYMPHOCYTES 01/29/2021 25  21 - 52 % Final    MONOCYTES 01/29/2021 9  3 - 10 % Final    EOSINOPHILS 01/29/2021 2  0 - 5 % Final    BASOPHILS 01/29/2021 0  0 - 2 % Final    ABS. NEUTROPHILS 01/29/2021 4.8  1.8 - 8.0 K/UL Final    ABS. LYMPHOCYTES 01/29/2021 1.9  0.9 - 3.6 K/UL Final    ABS. MONOCYTES 01/29/2021 0.7  0.05 - 1.2 K/UL Final    ABS. EOSINOPHILS 01/29/2021 0.2  0.0 - 0.4 K/UL Final    ABS. BASOPHILS 01/29/2021 0.0  0.0 - 0.1 K/UL Final    DF 01/29/2021 AUTOMATED    Final    Sodium 01/29/2021 140  136 - 145 mmol/L Final    Potassium 01/29/2021 4.6  3.5 - 5.5 mmol/L Final    Chloride 01/29/2021 105  100 - 111 mmol/L Final    CO2 01/29/2021 32  21 - 32 mmol/L Final    Anion gap 01/29/2021 3  3.0 - 18 mmol/L Final    Glucose 01/29/2021 75  74 - 99 mg/dL Final    BUN 01/29/2021 16  7.0 - 18 MG/DL Final    Creatinine 01/29/2021 1.02  0.6 - 1.3 MG/DL Final    BUN/Creatinine ratio 01/29/2021 16  12 - 20   Final    GFR est AA 01/29/2021 >60  >60 ml/min/1.73m2 Final    GFR est non-AA 01/29/2021 >60  >60 ml/min/1.73m2 Final    Comment: (NOTE)  Estimated GFR is calculated using the Modification of Diet in Renal   Disease (MDRD) Study equation, reported for both  Americans   (GFRAA) and non- Americans (GFRNA), and normalized to 1.73m2   body surface area. The physician must decide which value applies to   the patient. The MDRD study equation should only be used in   individuals age 25 or older. It has not been validated for the   following: pregnant women, patients with serious comorbid conditions,   or on certain medications, or persons with extremes of body size,   muscle mass, or nutritional status.  Calcium 01/29/2021 9.4  8.5 - 10.1 MG/DL Final    Bilirubin, total 01/29/2021 0.6  0.2 - 1.0 MG/DL Final    ALT (SGPT) 01/29/2021 37  16 - 61 U/L Final    AST (SGOT) 01/29/2021 21  10 - 38 U/L Final    Alk.  phosphatase 01/29/2021 64  45 - 117 U/L Final    Protein, total 01/29/2021 7.3  6.4 - 8.2 g/dL Final    Albumin 01/29/2021 4.1  3.4 - 5.0 g/dL Final    Globulin 01/29/2021 3.2  2.0 - 4.0 g/dL Final    A-G Ratio 01/29/2021 1.3  0.8 - 1.7   Final    LIPID PROFILE 01/29/2021        Final    Cholesterol, total 01/29/2021 176  <200 MG/DL Final    Triglyceride 01/29/2021 55  <150 MG/DL Final    Comment: The drugs N-acetylcysteine (NAC) and  Metamiszole have been found to cause falsely  low results in this chemical assay. Please  be sure to submit blood samples obtained  BEFORE administration of either of these  drugs to assure correct results.  HDL Cholesterol 01/29/2021 58  40 - 60 MG/DL Final    LDL, calculated 01/29/2021 107* 0 - 100 MG/DL Final    VLDL, calculated 01/29/2021 11  MG/DL Final    CHOL/HDL Ratio 01/29/2021 3.0  0 - 5.0   Final       .No results found for any visits on 02/10/22. Assessment / Plan:      ICD-10-CM ICD-9-CM    1. Essential hypertension with goal blood pressure less than 140/90  I10 401.9 LIPID PANEL      METABOLIC PANEL, COMPREHENSIVE      CBC WITH AUTOMATED DIFF   2. Aphthous stomatitis  K12.0 528.2 aluminum-magnesium hydroxide 200-200 mg/5 mL susp 30 mL, diphenhydrAMINE 12.5 mg/5 mL liqd 75 mg, lidocaine 2 % soln 30 mL, nystatin 100,000 unit/mL susp 3,000,000 Units oral suspension (compounded)     Follow-up and Dispositions    · Return in about 6 months (around 8/10/2022). Fasting labs ordered  Magic mouthwash rx given    I asked the patient if he  had any questions and answered his  questions. The patient stated that he understands the treatment plan and agrees with the treatment plan    This document was created with a voice activated dictation system and may contain transcription errors.

## 2022-02-10 NOTE — TELEPHONE ENCOUNTER
Hafsa at [address] [facility], [address] [facility],  [address] called needing clarification on magic mouthwash for patient.

## 2022-03-09 ENCOUNTER — TELEPHONE (OUTPATIENT)
Dept: FAMILY MEDICINE CLINIC | Age: 37
End: 2022-03-09

## 2022-03-18 PROBLEM — B00.1 HERPES LABIALIS: Status: ACTIVE | Noted: 2018-07-30

## 2022-03-20 PROBLEM — E66.3 OVERWEIGHT (BMI 25.0-29.9): Status: ACTIVE | Noted: 2018-02-12

## 2022-03-20 PROBLEM — Z90.49 H/O PAROTIDECTOMY: Status: ACTIVE | Noted: 2019-06-17

## 2022-07-20 DIAGNOSIS — I10 ESSENTIAL HYPERTENSION WITH GOAL BLOOD PRESSURE LESS THAN 140/90: ICD-10-CM

## 2022-07-22 RX ORDER — LISINOPRIL 10 MG/1
TABLET ORAL
Qty: 90 TABLET | Refills: 0 | Status: SHIPPED | OUTPATIENT
Start: 2022-07-22

## 2022-11-09 ENCOUNTER — HOSPITAL ENCOUNTER (EMERGENCY)
Age: 37
Discharge: LEFT BEFORE DISCHARGE | End: 2022-11-09
Attending: EMERGENCY MEDICINE
Payer: COMMERCIAL

## 2022-11-09 PROCEDURE — 75810000275 HC EMERGENCY DEPT VISIT NO LEVEL OF CARE

## 2022-11-10 DIAGNOSIS — I10 ESSENTIAL HYPERTENSION WITH GOAL BLOOD PRESSURE LESS THAN 140/90: ICD-10-CM

## 2022-11-10 RX ORDER — LISINOPRIL 10 MG/1
TABLET ORAL
Qty: 90 TABLET | Refills: 0 | Status: SHIPPED | OUTPATIENT
Start: 2022-11-10

## 2022-11-10 NOTE — TELEPHONE ENCOUNTER
This patient contacted office for the following prescriptions to be filled:    Last office visit: 2/10/22  Follow up appointment: 12/2/22  Medication requested :   Requested Prescriptions     Pending Prescriptions Disp Refills    lisinopriL (PRINIVIL, ZESTRIL) 10 mg tablet 90 Tablet 0     Sig: TAKE 1 TABLET BY MOUTH ONCE DAILY FOR HIGH BLOOD PRESSURE       PCP: Nish ( [facility]  [facility]

## 2022-12-16 ENCOUNTER — OFFICE VISIT (OUTPATIENT)
Dept: FAMILY MEDICINE CLINIC | Age: 37
End: 2022-12-16
Payer: COMMERCIAL

## 2022-12-16 VITALS
SYSTOLIC BLOOD PRESSURE: 119 MMHG | HEIGHT: 71 IN | HEART RATE: 86 BPM | WEIGHT: 214 LBS | OXYGEN SATURATION: 99 % | DIASTOLIC BLOOD PRESSURE: 85 MMHG | RESPIRATION RATE: 14 BRPM | BODY MASS INDEX: 29.96 KG/M2

## 2022-12-16 DIAGNOSIS — I10 ESSENTIAL HYPERTENSION WITH GOAL BLOOD PRESSURE LESS THAN 140/90: Primary | ICD-10-CM

## 2022-12-16 DIAGNOSIS — M10.40 OTHER SECONDARY GOUT, UNSPECIFIED CHRONICITY, UNSPECIFIED SITE: ICD-10-CM

## 2022-12-16 DIAGNOSIS — E78.00 PURE HYPERCHOLESTEROLEMIA: ICD-10-CM

## 2022-12-16 DIAGNOSIS — Z11.59 ENCOUNTER FOR HEPATITIS C SCREENING TEST FOR LOW RISK PATIENT: ICD-10-CM

## 2022-12-16 DIAGNOSIS — Z23 NEEDS FLU SHOT: ICD-10-CM

## 2022-12-16 RX ORDER — LISINOPRIL 10 MG/1
TABLET ORAL
Qty: 90 TABLET | Refills: 2 | Status: SHIPPED | OUTPATIENT
Start: 2022-12-16

## 2022-12-16 NOTE — PROGRESS NOTES
Fannie Bajwa is a 42 y.o. male presenting today for Establish Care (Would like to discuss weight loss. Would like to get Flu vaccine today. )  . Chief Complaint   Patient presents with    Establish Care     Would like to discuss weight loss. Would like to get Flu vaccine today. HPI:  Fannie Bajwa presents to the office today to establish care. Patient has a PMHx of HTN, gout, HLD. HTN: He is prescribed lisinopril daily. Denies any chest pain, palpitations, headaches, LE swelling. Gout: No recent gout flare ups since 3 years. HLD: Lipid panel in 1/21 showed , HDL 58, triglyceride 55. Review of Systems   Constitutional:  Negative for chills, diaphoresis, fever, malaise/fatigue and weight loss. HENT:  Negative for congestion, ear discharge, ear pain, hearing loss, nosebleeds, sinus pain, sore throat and tinnitus. Eyes:  Negative for blurred vision, double vision and photophobia. Respiratory:  Negative for cough, sputum production, shortness of breath, wheezing and stridor. Cardiovascular:  Negative for chest pain, palpitations, orthopnea, claudication and leg swelling. Gastrointestinal:  Negative for abdominal pain, constipation, diarrhea, heartburn, nausea and vomiting. Genitourinary:  Negative for dysuria, flank pain, frequency, hematuria and urgency. Musculoskeletal:  Negative for back pain, joint pain, myalgias and neck pain. Skin:  Negative for rash. Neurological:  Negative for tingling, tremors, sensory change, speech change, focal weakness, seizures, weakness and headaches. Psychiatric/Behavioral:  Negative for depression. The patient is not nervous/anxious. All other systems reviewed and are negative.     No Known Allergies    PHQ Screening   3 most recent PHQ Screens 12/16/2022   Little interest or pleasure in doing things Not at all   Feeling down, depressed, irritable, or hopeless Not at all   Total Score PHQ 2 0       History  Past Medical History: Diagnosis Date    Gout     Hypertension        Past Surgical History:   Procedure Laterality Date    HX CYST REMOVAL Left 07/01/2019    Cyst on left side of neck       Social History     Socioeconomic History    Marital status:      Spouse name: Not on file    Number of children: Not on file    Years of education: Not on file    Highest education level: Not on file   Occupational History    Not on file   Tobacco Use    Smoking status: Never    Smokeless tobacco: Never   Substance and Sexual Activity    Alcohol use: No     Alcohol/week: 0.0 standard drinks    Drug use: No    Sexual activity: Yes     Partners: Female   Other Topics Concern    Not on file   Social History Narrative    Not on file     Social Determinants of Health     Financial Resource Strain: Not on file   Food Insecurity: Not on file   Transportation Needs: Not on file   Physical Activity: Not on file   Stress: Not on file   Social Connections: Not on file   Intimate Partner Violence: Not on file   Housing Stability: Not on file       Current Outpatient Medications   Medication Sig Dispense Refill    lisinopriL (PRINIVIL, ZESTRIL) 10 mg tablet TAKE 1 TABLET BY MOUTH ONCE DAILY FOR HIGH BLOOD PRESSURE 90 Tablet 2         Vitals:    12/16/22 1429   BP: 119/85   Pulse: 86   Resp: 14   SpO2: 99%   Weight: 214 lb (97.1 kg)   Height: 5' 11\" (1.803 m)   PainSc:   0 - No pain       Physical Exam  Vitals and nursing note reviewed. Constitutional:       General: He is not in acute distress. Appearance: Normal appearance. He is not ill-appearing, toxic-appearing or diaphoretic. HENT:      Head: Normocephalic and atraumatic. Eyes:      General: No scleral icterus. Extraocular Movements: Extraocular movements intact. Conjunctiva/sclera: Conjunctivae normal.      Pupils: Pupils are equal, round, and reactive to light. Cardiovascular:      Rate and Rhythm: Normal rate and regular rhythm. Pulses: Normal pulses.       Heart sounds: Normal heart sounds. No murmur heard. No gallop. Pulmonary:      Effort: Pulmonary effort is normal. No respiratory distress. Breath sounds: Normal breath sounds. No wheezing or rales. Musculoskeletal:         General: No swelling or tenderness. Normal range of motion. Cervical back: Normal range of motion and neck supple. Right lower leg: No edema. Left lower leg: No edema. Skin:     General: Skin is warm and dry. Coloration: Skin is not jaundiced or pale. Neurological:      General: No focal deficit present. Mental Status: He is alert and oriented to person, place, and time. Mental status is at baseline. Cranial Nerves: No cranial nerve deficit. Motor: No weakness. Gait: Gait normal.   Psychiatric:         Mood and Affect: Mood normal.         Behavior: Behavior normal.         Thought Content: Thought content normal.         Judgment: Judgment normal.       No visits with results within 3 Month(s) from this visit. Latest known visit with results is:   Hospital Outpatient Visit on 01/29/2021   Component Date Value Ref Range Status    WBC 01/29/2021 7.6  4.6 - 13.2 K/uL Final    RBC 01/29/2021 5.81 (A)  4.70 - 5.50 M/uL Final    HGB 01/29/2021 14.3  13.0 - 16.0 g/dL Final    HCT 01/29/2021 43.2  36.0 - 48.0 % Final    MCV 01/29/2021 74.4  74.0 - 97.0 FL Final    MCH 01/29/2021 24.6  24.0 - 34.0 PG Final    MCHC 01/29/2021 33.1  31.0 - 37.0 g/dL Final    RDW 01/29/2021 14.9 (A)  11.6 - 14.5 % Final    PLATELET 83/25/0689 446  135 - 420 K/uL Final    MPV 01/29/2021 8.8 (A)  9.2 - 11.8 FL Final    NEUTROPHILS 01/29/2021 64  40 - 73 % Final    LYMPHOCYTES 01/29/2021 25  21 - 52 % Final    MONOCYTES 01/29/2021 9  3 - 10 % Final    EOSINOPHILS 01/29/2021 2  0 - 5 % Final    BASOPHILS 01/29/2021 0  0 - 2 % Final    ABS. NEUTROPHILS 01/29/2021 4.8  1.8 - 8.0 K/UL Final    ABS. LYMPHOCYTES 01/29/2021 1.9  0.9 - 3.6 K/UL Final    ABS.  MONOCYTES 01/29/2021 0.7  0.05 - 1.2 K/UL Final    ABS. EOSINOPHILS 01/29/2021 0.2  0.0 - 0.4 K/UL Final    ABS. BASOPHILS 01/29/2021 0.0  0.0 - 0.1 K/UL Final    DF 01/29/2021 AUTOMATED    Final    Sodium 01/29/2021 140  136 - 145 mmol/L Final    Potassium 01/29/2021 4.6  3.5 - 5.5 mmol/L Final    Chloride 01/29/2021 105  100 - 111 mmol/L Final    CO2 01/29/2021 32  21 - 32 mmol/L Final    Anion gap 01/29/2021 3  3.0 - 18 mmol/L Final    Glucose 01/29/2021 75  74 - 99 mg/dL Final    BUN 01/29/2021 16  7.0 - 18 MG/DL Final    Creatinine 01/29/2021 1.02  0.6 - 1.3 MG/DL Final    BUN/Creatinine ratio 01/29/2021 16  12 - 20   Final    GFR est AA 01/29/2021 >60  >60 ml/min/1.73m2 Final    GFR est non-AA 01/29/2021 >60  >60 ml/min/1.73m2 Final    Comment: (NOTE)  Estimated GFR is calculated using the Modification of Diet in Renal   Disease (MDRD) Study equation, reported for both  Americans   (GFRAA) and non- Americans (GFRNA), and normalized to 1.73m2   body surface area. The physician must decide which value applies to   the patient. The MDRD study equation should only be used in   individuals age 25 or older. It has not been validated for the   following: pregnant women, patients with serious comorbid conditions,   or on certain medications, or persons with extremes of body size,   muscle mass, or nutritional status. Calcium 01/29/2021 9.4  8.5 - 10.1 MG/DL Final    Bilirubin, total 01/29/2021 0.6  0.2 - 1.0 MG/DL Final    ALT (SGPT) 01/29/2021 37  16 - 61 U/L Final    AST (SGOT) 01/29/2021 21  10 - 38 U/L Final    Alk.  phosphatase 01/29/2021 64  45 - 117 U/L Final    Protein, total 01/29/2021 7.3  6.4 - 8.2 g/dL Final    Albumin 01/29/2021 4.1  3.4 - 5.0 g/dL Final    Globulin 01/29/2021 3.2  2.0 - 4.0 g/dL Final    A-G Ratio 01/29/2021 1.3  0.8 - 1.7   Final    LIPID PROFILE 01/29/2021        Final    Cholesterol, total 01/29/2021 176  <200 MG/DL Final    Triglyceride 01/29/2021 55  <150 MG/DL Final    Comment: The drugs N-acetylcysteine (NAC) and  Metamiszole have been found to cause falsely  low results in this chemical assay. Please  be sure to submit blood samples obtained  BEFORE administration of either of these  drugs to assure correct results. HDL Cholesterol 01/29/2021 58  40 - 60 MG/DL Final    LDL, calculated 01/29/2021 107 (A)  0 - 100 MG/DL Final    VLDL, calculated 01/29/2021 11  MG/DL Final    CHOL/HDL Ratio 01/29/2021 3.0  0 - 5.0   Final       No results found for any visits on 12/16/22. Patient Care Team:  Patient Care Team:  Carnell Aase, NP as PCP - General (Nurse Practitioner)      Assessment / Plan:      ICD-10-CM ICD-9-CM    1. Essential hypertension with goal blood pressure less than 140/90  I10 401.9 CBC WITH AUTOMATED DIFF      METABOLIC PANEL, COMPREHENSIVE      lisinopriL (PRINIVIL, ZESTRIL) 10 mg tablet      2. Pure hypercholesterolemia  E78.00 272.0 LIPID PANEL      3. Encounter for hepatitis C screening test for low risk patient  Z11.59 V73.89 HEPATITIS C AB      4. Other secondary gout, unspecified chronicity, unspecified site  M10.40 274.89 URIC ACID      5. Needs flu shot  Z23 V04.81 INFLUENZA, FLUARIX, FLULAVAL, FLUZONE (AGE 6 MO+), AFLURIA(AGE 3Y+) IM, PF, 0.5 ML        Patient seen today to establish care. HTN: Controlled on lisinopril. Check BMP. HLD: Check repeat lipid panel. History of gout: No repeat flareups. Check uric acid level. Received flu shot today. Follow-up and Dispositions    Return in about 6 months (around 6/16/2023). I asked the patient if he  had any questions and answered his  questions. The patient stated that he understands the treatment plan and agrees with the treatment plan    This document was created with a voice activated dictation system and may contain transcription errors.

## 2022-12-22 ENCOUNTER — HOSPITAL ENCOUNTER (OUTPATIENT)
Dept: LAB | Age: 37
Discharge: HOME | End: 2022-12-22
Payer: COMMERCIAL

## 2022-12-22 DIAGNOSIS — I10 ESSENTIAL HYPERTENSION WITH GOAL BLOOD PRESSURE LESS THAN 140/90: ICD-10-CM

## 2022-12-22 DIAGNOSIS — M10.40 OTHER SECONDARY GOUT, UNSPECIFIED CHRONICITY, UNSPECIFIED SITE: ICD-10-CM

## 2022-12-22 DIAGNOSIS — Z11.59 ENCOUNTER FOR HEPATITIS C SCREENING TEST FOR LOW RISK PATIENT: ICD-10-CM

## 2022-12-22 DIAGNOSIS — E78.00 PURE HYPERCHOLESTEROLEMIA: ICD-10-CM

## 2022-12-22 LAB
ALBUMIN SERPL-MCNC: 2.9 G/DL (ref 3.4–5)
ALBUMIN/GLOB SERPL: 1.3 {RATIO} (ref 0.8–1.7)
ALP SERPL-CCNC: 44 U/L (ref 45–117)
ALT SERPL-CCNC: 34 U/L (ref 16–61)
ANION GAP SERPL CALC-SCNC: 2 MMOL/L (ref 3–18)
AST SERPL-CCNC: 21 U/L (ref 10–38)
BASOPHILS # BLD: 0.1 K/UL (ref 0–0.1)
BASOPHILS NFR BLD: 1 % (ref 0–2)
BILIRUB SERPL-MCNC: 0.4 MG/DL (ref 0.2–1)
BUN SERPL-MCNC: 16 MG/DL (ref 7–18)
BUN/CREAT SERPL: 16 (ref 12–20)
CALCIUM SERPL-MCNC: 8.4 MG/DL (ref 8.5–10.1)
CHLORIDE SERPL-SCNC: 108 MMOL/L (ref 100–111)
CHOLEST SERPL-MCNC: 141 MG/DL
CO2 SERPL-SCNC: 30 MMOL/L (ref 21–32)
CREAT SERPL-MCNC: 0.99 MG/DL (ref 0.6–1.3)
DIFFERENTIAL METHOD BLD: ABNORMAL
EOSINOPHIL # BLD: 0.3 K/UL (ref 0–0.4)
EOSINOPHIL NFR BLD: 6 % (ref 0–5)
ERYTHROCYTE [DISTWIDTH] IN BLOOD BY AUTOMATED COUNT: 15 % (ref 11.6–14.5)
GLOBULIN SER CALC-MCNC: 2.3 G/DL (ref 2–4)
GLUCOSE SERPL-MCNC: 97 MG/DL (ref 74–99)
HCT VFR BLD AUTO: 44.9 % (ref 36–48)
HDLC SERPL-MCNC: 41 MG/DL (ref 40–60)
HDLC SERPL: 3.4 {RATIO} (ref 0–5)
HGB BLD-MCNC: 14.1 G/DL (ref 13–16)
IMM GRANULOCYTES # BLD AUTO: 0 K/UL (ref 0–0.04)
IMM GRANULOCYTES NFR BLD AUTO: 0 % (ref 0–0.5)
LDLC SERPL CALC-MCNC: 84.8 MG/DL (ref 0–100)
LIPID PROFILE,FLP: NORMAL
LYMPHOCYTES # BLD: 1.3 K/UL (ref 0.9–3.6)
LYMPHOCYTES NFR BLD: 28 % (ref 21–52)
MCH RBC QN AUTO: 24.5 PG (ref 24–34)
MCHC RBC AUTO-ENTMCNC: 31.4 G/DL (ref 31–37)
MCV RBC AUTO: 78 FL (ref 78–100)
MONOCYTES # BLD: 0.5 K/UL (ref 0.05–1.2)
MONOCYTES NFR BLD: 11 % (ref 3–10)
NEUTS SEG # BLD: 2.5 K/UL (ref 1.8–8)
NEUTS SEG NFR BLD: 54 % (ref 40–73)
NRBC # BLD: 0 K/UL (ref 0–0.01)
NRBC BLD-RTO: 0 PER 100 WBC
PLATELET # BLD AUTO: 190 K/UL (ref 135–420)
PMV BLD AUTO: 9.2 FL (ref 9.2–11.8)
POTASSIUM SERPL-SCNC: 4.3 MMOL/L (ref 3.5–5.5)
PROT SERPL-MCNC: 5.2 G/DL (ref 6.4–8.2)
RBC # BLD AUTO: 5.76 M/UL (ref 4.35–5.65)
SODIUM SERPL-SCNC: 140 MMOL/L (ref 136–145)
TRIGL SERPL-MCNC: 76 MG/DL (ref ?–150)
URATE SERPL-MCNC: 6.3 MG/DL (ref 2.6–7.2)
VLDLC SERPL CALC-MCNC: 15.2 MG/DL
WBC # BLD AUTO: 4.6 K/UL (ref 4.6–13.2)

## 2022-12-22 PROCEDURE — 86803 HEPATITIS C AB TEST: CPT

## 2022-12-22 PROCEDURE — 84550 ASSAY OF BLOOD/URIC ACID: CPT

## 2022-12-22 PROCEDURE — 80053 COMPREHEN METABOLIC PANEL: CPT

## 2022-12-22 PROCEDURE — 85025 COMPLETE CBC W/AUTO DIFF WBC: CPT

## 2022-12-22 PROCEDURE — 36415 COLL VENOUS BLD VENIPUNCTURE: CPT

## 2022-12-22 PROCEDURE — 80061 LIPID PANEL: CPT

## 2022-12-23 LAB
HCV AB SER IA-ACNC: <0.02 INDEX
HCV AB SERPL QL IA: NEGATIVE
HCV COMMENT,HCGAC: NORMAL

## 2022-12-27 ENCOUNTER — TELEPHONE (OUTPATIENT)
Dept: FAMILY MEDICINE CLINIC | Age: 37
End: 2022-12-27

## 2022-12-27 NOTE — TELEPHONE ENCOUNTER
Spoke to patient and advised of labs. He stated that he understood and had no questions or concerns. He did report having cold like symptoms and tinnitus is his ears. He states that he has experienced this before and that if it persisted longer than anticipated that he would report to the ED or urgent care. He had no further concerns or questions.

## 2022-12-27 NOTE — TELEPHONE ENCOUNTER
----- Message from Jose Richmond MD sent at 12/23/2022  2:47 PM EST -----  Please inform patient that his cholesterol levels are improved with LDL of 84.8 from 107 previously.   Blood counts and renal function is normal.

## 2022-12-29 ENCOUNTER — HOSPITAL ENCOUNTER (EMERGENCY)
Age: 37
Discharge: HOME | End: 2022-12-29
Attending: EMERGENCY MEDICINE
Payer: COMMERCIAL

## 2022-12-29 VITALS
RESPIRATION RATE: 16 BRPM | HEART RATE: 95 BPM | OXYGEN SATURATION: 97 % | TEMPERATURE: 98.3 F | SYSTOLIC BLOOD PRESSURE: 153 MMHG | DIASTOLIC BLOOD PRESSURE: 100 MMHG

## 2022-12-29 DIAGNOSIS — J06.9 ACUTE UPPER RESPIRATORY INFECTION: Primary | ICD-10-CM

## 2022-12-29 PROCEDURE — 99283 EMERGENCY DEPT VISIT LOW MDM: CPT

## 2022-12-29 RX ORDER — MENTHOL
1 LOZENGE MUCOUS MEMBRANE
Qty: 50 G | Refills: 0 | Status: SHIPPED | OUTPATIENT
Start: 2022-12-29

## 2022-12-29 RX ORDER — L-DESOXYEPHEDRINE 50 MG
1 INHALER (EA) NASAL
Qty: 118 ML | Refills: 0 | Status: SHIPPED | OUTPATIENT
Start: 2022-12-29

## 2022-12-29 RX ORDER — GUAIFENESIN 600 MG/1
600 TABLET, EXTENDED RELEASE ORAL 2 TIMES DAILY
Qty: 20 TABLET | Refills: 0 | Status: SHIPPED | OUTPATIENT
Start: 2022-12-29

## 2022-12-29 RX ORDER — VAPORIZER
1 EACH MISCELLANEOUS
Qty: 1 EACH | Refills: 0 | Status: SHIPPED | OUTPATIENT
Start: 2022-12-29

## 2022-12-29 RX ORDER — FLUTICASONE PROPIONATE 50 MCG
2 SPRAY, SUSPENSION (ML) NASAL DAILY
Qty: 1 EACH | Refills: 0 | Status: SHIPPED | OUTPATIENT
Start: 2022-12-29

## 2022-12-29 RX ORDER — AMOXICILLIN AND CLAVULANATE POTASSIUM 875; 125 MG/1; MG/1
1 TABLET, FILM COATED ORAL 2 TIMES DAILY
Qty: 20 TABLET | Refills: 0 | Status: SHIPPED | OUTPATIENT
Start: 2022-12-29 | End: 2023-01-08

## 2022-12-30 NOTE — ED PROVIDER NOTES
EMERGENCY DEPARTMENT HISTORY AND PHYSICAL EXAM    Date: 12/29/2022  Patient Name: Kash Jeffers    History of Presenting Illness     Chief Complaint   Patient presents with    Nasal Congestion    Cough         History Provided By: Patient  Additional History (Context): Kash Jeffers is a 42 y.o. male with hypertension and hyperlipidemia who presents with nasal congestion cough for 3 days. His brother and sister-in-law are both diagnosed with pneumonia at home. Denies fever or tobacco use. Denies any shortness of breath. He is passing large amounts of green rhinorrhea and has some chest discomfort with coughing only. Patient taking over-the-counter Coricidin and NyQuil. Minimal relief. Having sinus pressure. PCP: Eden Chaparro NP    Current Outpatient Medications   Medication Sig Dispense Refill    amoxicillin-clavulanate (Augmentin) 875-125 mg per tablet Take 1 Tablet by mouth two (2) times a day for 10 days. 20 Tablet 0    fluticasone propionate (FLONASE) 50 mcg/actuation nasal spray 2 Sprays by Both Nostrils route daily. 1 Each 0    guaiFENesin ER (Mucinex) 600 mg ER tablet Take 1 Tablet by mouth two (2) times a day. 20 Tablet 0    Camphor-Eucalyptus Oil-Menthol (Vicks Vaporub) 4.8-1.2-2.6 % oint 1 Actuation(s) by Apply Externally route three (3) times daily as needed for Cough or PRN Reason (Other) (congestion). 50 g 0    camphor-eucalyptus-menthol (Vicks Vaposteam) liqd 1 Actuation(s) by Does Not Apply route three (3) times daily as needed for Cough or PRN Reason (Other) (congestion). 118 mL 0    Vaporizers (Vicks Warm Steam Vaporizer) misc 1 Actuation(s) by Does Not Apply route three (3) times daily as needed for Cough or PRN Reason (Other) (congestion).  1 Each 0    lisinopriL (PRINIVIL, ZESTRIL) 10 mg tablet TAKE 1 TABLET BY MOUTH ONCE DAILY FOR HIGH BLOOD PRESSURE 90 Tablet 2       Past History     Past Medical History:  Past Medical History:   Diagnosis Date    Gout     Hypertension        Past Surgical History:  Past Surgical History:   Procedure Laterality Date    HX CYST REMOVAL Left 07/01/2019    Cyst on left side of neck       Family History:  Family History   Problem Relation Age of Onset    Heart Disease Mother     Heart Disease Maternal Grandfather     Stroke Maternal Grandfather     Hypertension Maternal Grandfather     Breast Cancer Paternal Grandmother     Heart Attack Paternal Grandfather        Social History:  Social History     Tobacco Use    Smoking status: Never    Smokeless tobacco: Never   Substance Use Topics    Alcohol use: No     Alcohol/week: 0.0 standard drinks    Drug use: No       Allergies:  No Known Allergies      Review of Systems   Review of Systems   Constitutional:  Negative for fever. HENT:  Positive for congestion and rhinorrhea. Negative for sore throat. Respiratory:  Positive for cough. Negative for shortness of breath and wheezing. All Other Systems Negative  Physical Exam     Vitals:    12/29/22 1924   BP: (!) 153/100   Pulse: 95   Resp: 16   Temp: 98.3 °F (36.8 °C)   SpO2: 97%     Physical Exam  Vitals and nursing note reviewed. Constitutional:       General: He is not in acute distress. Appearance: He is well-developed. He is not ill-appearing, toxic-appearing or diaphoretic. HENT:      Head: Normocephalic and atraumatic. Nose: Congestion and rhinorrhea present. Neck:      Thyroid: No thyromegaly. Vascular: No carotid bruit. Trachea: No tracheal deviation. Cardiovascular:      Rate and Rhythm: Normal rate and regular rhythm. Heart sounds: Normal heart sounds. No murmur heard. No friction rub. No gallop. Pulmonary:      Effort: Pulmonary effort is normal. No respiratory distress. Breath sounds: Normal breath sounds. No stridor. No wheezing or rales. Chest:      Chest wall: No tenderness. Abdominal:      General: There is no distension. Palpations: Abdomen is soft. There is no mass. Tenderness:  There is no abdominal tenderness. There is no guarding or rebound. Musculoskeletal:         General: Normal range of motion. Cervical back: Normal range of motion and neck supple. Skin:     General: Skin is warm and dry. Coloration: Skin is not pale. Neurological:      Mental Status: He is alert. Psychiatric:         Speech: Speech normal.         Behavior: Behavior normal.         Thought Content: Thought content normal.         Judgment: Judgment normal.        Diagnostic Study Results     Labs -   No results found for this or any previous visit (from the past 12 hour(s)). Radiologic Studies -   No orders to display     CT Results  (Last 48 hours)      None          CXR Results  (Last 48 hours)      None              Medical Decision Making   I am the first provider for this patient. I reviewed the vital signs, available nursing notes, past medical history, past surgical history, family history and social history. Vital Signs-Reviewed the patient's vital signs. Records Reviewed: Nursing Notes    Procedures:  Procedures    Provider Notes (Medical Decision Making): With multiple family members sick with pneumonia at home and patient's symptoms beginning to be similar, do not feel patient has COVID or flu 3 days and he is not going to work until Monday he does not need these tests is much as he needs treatment for his symptoms as well as Augmentin as well. Discharge home not hypoxic. MED RECONCILIATION:  No current facility-administered medications for this encounter. Current Outpatient Medications   Medication Sig    amoxicillin-clavulanate (Augmentin) 875-125 mg per tablet Take 1 Tablet by mouth two (2) times a day for 10 days. fluticasone propionate (FLONASE) 50 mcg/actuation nasal spray 2 Sprays by Both Nostrils route daily. guaiFENesin ER (Mucinex) 600 mg ER tablet Take 1 Tablet by mouth two (2) times a day.     Camphor-Eucalyptus Oil-Menthol (Vicks Vaporub) 4.8-1.2-2.6 % oint 1 Actuation(s) by Apply Externally route three (3) times daily as needed for Cough or PRN Reason (Other) (congestion). camphor-eucalyptus-menthol (Vicks Vaposteam) liqd 1 Actuation(s) by Does Not Apply route three (3) times daily as needed for Cough or PRN Reason (Other) (congestion). Vaporizers (Vicks Warm Steam Vaporizer) misc 1 Actuation(s) by Does Not Apply route three (3) times daily as needed for Cough or PRN Reason (Other) (congestion). lisinopriL (PRINIVIL, ZESTRIL) 10 mg tablet TAKE 1 TABLET BY MOUTH ONCE DAILY FOR HIGH BLOOD PRESSURE       Disposition:  home    DISCHARGE NOTE:   7:51 PM    Pt has been reexamined. Patient has no new complaints, changes, or physical findings. Care plan outlined and precautions discussed. Results of exam were reviewed with the patient. All medications were reviewed with the patient; will d/c home with augmentin, flonase, mucinex, vicks. All of pt's questions and concerns were addressed. Patient was instructed and agrees to follow up with PCP, as well as to return to the ED upon further deterioration. Patient is ready to go home. Follow-up Information       Follow up With Specialties Details Why Contact Info    Jerzy Segura NP Nurse Practitioner Schedule an appointment as soon as possible for a visit in 2 days  [address] [facility]      [facility] EMERGENCY DEPT Emergency Medicine  If symptoms worsen return immediately [address]  [phone]            Current Discharge Medication List        START taking these medications    Details   amoxicillin-clavulanate (Augmentin) 875-125 mg per tablet Take 1 Tablet by mouth two (2) times a day for 10 days. Qty: 20 Tablet, Refills: 0  Start date: 12/29/2022, End date: 1/8/2023      fluticasone propionate (FLONASE) 50 mcg/actuation nasal spray 2 Sprays by Both Nostrils route daily.   Qty: 1 Each, Refills: 0  Start date: 12/29/2022      guaiFENesin ER (Mucinex) 600 mg ER tablet Take 1 Tablet by mouth two (2) times a day. Qty: 20 Tablet, Refills: 0  Start date: 12/29/2022      Camphor-Eucalyptus Oil-Menthol (Vicks Vaporub) 4.8-1.2-2.6 % oint 1 Actuation(s) by Apply Externally route three (3) times daily as needed for Cough or PRN Reason (Other) (congestion). Qty: 50 g, Refills: 0  Start date: 12/29/2022      camphor-eucalyptus-menthol (Vicks Vaposteam) liqd 1 Actuation(s) by Does Not Apply route three (3) times daily as needed for Cough or PRN Reason (Other) (congestion). Qty: 118 mL, Refills: 0  Start date: 12/29/2022      Vaporizers (Vicks Warm Steam Vaporizer) misc 1 Actuation(s) by Does Not Apply route three (3) times daily as needed for Cough or PRN Reason (Other) (congestion). Qty: 1 Each, Refills: 0  Start date: 12/29/2022               Diagnosis     Clinical Impression:   1.  Acute upper respiratory infection

## 2023-01-19 DIAGNOSIS — I10 ESSENTIAL HYPERTENSION WITH GOAL BLOOD PRESSURE LESS THAN 140/90: ICD-10-CM

## 2023-01-19 RX ORDER — LISINOPRIL 10 MG/1
TABLET ORAL
Qty: 90 TABLET | Refills: 2 | Status: SHIPPED | OUTPATIENT
Start: 2023-01-19

## 2023-01-19 NOTE — TELEPHONE ENCOUNTER
Requested Prescriptions     Pending Prescriptions Disp Refills    lisinopriL (PRINIVIL, ZESTRIL) 10 mg tablet 90 Tablet 2     Sig: TAKE 1 TABLET BY MOUTH ONCE DAILY FOR HIGH BLOOD PRESSURE

## 2023-02-28 DIAGNOSIS — I10 ESSENTIAL (PRIMARY) HYPERTENSION: Primary | ICD-10-CM

## 2023-02-28 RX ORDER — LISINOPRIL 10 MG/1
TABLET ORAL
Qty: 90 TABLET | Refills: 1 | Status: SHIPPED | OUTPATIENT
Start: 2023-02-28

## 2023-02-28 NOTE — TELEPHONE ENCOUNTER
Patient called requesting refill for LISINOPRIL sent to [facility] on [facility], [place]

## 2023-10-27 ENCOUNTER — TELEPHONE (OUTPATIENT)
Facility: CLINIC | Age: 38
End: 2023-10-27

## 2023-10-27 DIAGNOSIS — I10 ESSENTIAL (PRIMARY) HYPERTENSION: ICD-10-CM

## 2023-10-27 RX ORDER — LISINOPRIL 10 MG/1
TABLET ORAL
Qty: 90 TABLET | Refills: 0 | Status: SHIPPED | OUTPATIENT
Start: 2023-10-27

## 2023-10-27 NOTE — TELEPHONE ENCOUNTER
Patient called and needs a medication refill on lisinopril (PRINIVIL;ZESTRIL) 10 MG tablet. Please advise.

## 2024-03-06 ENCOUNTER — TELEPHONE (OUTPATIENT)
Facility: CLINIC | Age: 39
End: 2024-03-06

## 2024-03-06 DIAGNOSIS — I10 ESSENTIAL (PRIMARY) HYPERTENSION: ICD-10-CM

## 2024-03-06 RX ORDER — LISINOPRIL 10 MG/1
TABLET ORAL
Qty: 90 TABLET | Refills: 0 | Status: SHIPPED | OUTPATIENT
Start: 2024-03-06

## 2024-03-13 ENCOUNTER — OFFICE VISIT (OUTPATIENT)
Facility: CLINIC | Age: 39
End: 2024-03-13
Payer: COMMERCIAL

## 2024-03-13 VITALS
WEIGHT: 214 LBS | RESPIRATION RATE: 16 BRPM | DIASTOLIC BLOOD PRESSURE: 84 MMHG | HEIGHT: 71 IN | OXYGEN SATURATION: 99 % | TEMPERATURE: 99.3 F | BODY MASS INDEX: 29.96 KG/M2 | HEART RATE: 91 BPM | SYSTOLIC BLOOD PRESSURE: 115 MMHG

## 2024-03-13 DIAGNOSIS — E78.00 PURE HYPERCHOLESTEROLEMIA, UNSPECIFIED: ICD-10-CM

## 2024-03-13 DIAGNOSIS — Z13.29 SCREENING FOR ENDOCRINE DISORDER: ICD-10-CM

## 2024-03-13 DIAGNOSIS — K12.0 ORAL APHTHOUS ULCER: ICD-10-CM

## 2024-03-13 DIAGNOSIS — M10.40 OTHER SECONDARY GOUT, UNSPECIFIED SITE: ICD-10-CM

## 2024-03-13 DIAGNOSIS — I10 ESSENTIAL (PRIMARY) HYPERTENSION: Primary | ICD-10-CM

## 2024-03-13 PROCEDURE — G8427 DOCREV CUR MEDS BY ELIG CLIN: HCPCS | Performed by: STUDENT IN AN ORGANIZED HEALTH CARE EDUCATION/TRAINING PROGRAM

## 2024-03-13 PROCEDURE — G8419 CALC BMI OUT NRM PARAM NOF/U: HCPCS | Performed by: STUDENT IN AN ORGANIZED HEALTH CARE EDUCATION/TRAINING PROGRAM

## 2024-03-13 PROCEDURE — 1036F TOBACCO NON-USER: CPT | Performed by: STUDENT IN AN ORGANIZED HEALTH CARE EDUCATION/TRAINING PROGRAM

## 2024-03-13 PROCEDURE — 99214 OFFICE O/P EST MOD 30 MIN: CPT | Performed by: STUDENT IN AN ORGANIZED HEALTH CARE EDUCATION/TRAINING PROGRAM

## 2024-03-13 PROCEDURE — 3074F SYST BP LT 130 MM HG: CPT | Performed by: STUDENT IN AN ORGANIZED HEALTH CARE EDUCATION/TRAINING PROGRAM

## 2024-03-13 PROCEDURE — 3079F DIAST BP 80-89 MM HG: CPT | Performed by: STUDENT IN AN ORGANIZED HEALTH CARE EDUCATION/TRAINING PROGRAM

## 2024-03-13 PROCEDURE — G8484 FLU IMMUNIZE NO ADMIN: HCPCS | Performed by: STUDENT IN AN ORGANIZED HEALTH CARE EDUCATION/TRAINING PROGRAM

## 2024-03-13 RX ORDER — LISINOPRIL 10 MG/1
TABLET ORAL
Qty: 90 TABLET | Refills: 3 | Status: SHIPPED | OUTPATIENT
Start: 2024-03-13

## 2024-03-13 SDOH — ECONOMIC STABILITY: FOOD INSECURITY: WITHIN THE PAST 12 MONTHS, THE FOOD YOU BOUGHT JUST DIDN'T LAST AND YOU DIDN'T HAVE MONEY TO GET MORE.: NEVER TRUE

## 2024-03-13 SDOH — ECONOMIC STABILITY: FOOD INSECURITY: WITHIN THE PAST 12 MONTHS, YOU WORRIED THAT YOUR FOOD WOULD RUN OUT BEFORE YOU GOT MONEY TO BUY MORE.: NEVER TRUE

## 2024-03-13 SDOH — ECONOMIC STABILITY: HOUSING INSECURITY
IN THE LAST 12 MONTHS, WAS THERE A TIME WHEN YOU DID NOT HAVE A STEADY PLACE TO SLEEP OR SLEPT IN A SHELTER (INCLUDING NOW)?: NO

## 2024-03-13 SDOH — ECONOMIC STABILITY: INCOME INSECURITY: HOW HARD IS IT FOR YOU TO PAY FOR THE VERY BASICS LIKE FOOD, HOUSING, MEDICAL CARE, AND HEATING?: NOT HARD AT ALL

## 2024-03-13 ASSESSMENT — PATIENT HEALTH QUESTIONNAIRE - PHQ9
1. LITTLE INTEREST OR PLEASURE IN DOING THINGS: 0
SUM OF ALL RESPONSES TO PHQ9 QUESTIONS 1 & 2: 0
SUM OF ALL RESPONSES TO PHQ QUESTIONS 1-9: 0
2. FEELING DOWN, DEPRESSED OR HOPELESS: 0
SUM OF ALL RESPONSES TO PHQ QUESTIONS 1-9: 0

## 2024-03-13 ASSESSMENT — ENCOUNTER SYMPTOMS
RHINORRHEA: 0
SHORTNESS OF BREATH: 0
CHEST TIGHTNESS: 0
ABDOMINAL PAIN: 0
BACK PAIN: 0
NAUSEA: 0
VOMITING: 0
CONSTIPATION: 0
DIARRHEA: 0

## 2024-03-13 ASSESSMENT — ANXIETY QUESTIONNAIRES
2. NOT BEING ABLE TO STOP OR CONTROL WORRYING: 0
6. BECOMING EASILY ANNOYED OR IRRITABLE: 0
IF YOU CHECKED OFF ANY PROBLEMS ON THIS QUESTIONNAIRE, HOW DIFFICULT HAVE THESE PROBLEMS MADE IT FOR YOU TO DO YOUR WORK, TAKE CARE OF THINGS AT HOME, OR GET ALONG WITH OTHER PEOPLE: NOT DIFFICULT AT ALL
GAD7 TOTAL SCORE: 0
5. BEING SO RESTLESS THAT IT IS HARD TO SIT STILL: 0
3. WORRYING TOO MUCH ABOUT DIFFERENT THINGS: 0
4. TROUBLE RELAXING: 0
7. FEELING AFRAID AS IF SOMETHING AWFUL MIGHT HAPPEN: 0
1. FEELING NERVOUS, ANXIOUS, OR ON EDGE: 0

## 2024-03-13 NOTE — PROGRESS NOTES
\"Have you been to the ER, urgent care clinic since your last visit?  Hospitalized since your last visit?\"    NO    “Have you seen or consulted any other health care providers outside of [facility] since your last visit?”    NO            Click Here for Release of Records Request

## 2024-03-13 NOTE — PROGRESS NOTES
Savior Yeo is a 39 y.o. male presenting today for Follow-up  .     Chief Complaint   Patient presents with    Follow-up       HPI:  Savior Yeo presents to the office today for follow up.    Patient has a PMHx of HTN, gout, HLD.    HTN: He is prescribed lisinopril daily. Denies any chest pain, palpitations, headaches, LE swelling.    Gout: No recent gout flare ups.  Last uric acid level was 6.3 in 12/2022    HLD: Lipid panel in 12/2022 shows LDL 84.8, HDL 41, triglycerides 76.    Patient has been experiencing recurrent oral cold sores.  Feels they are associated with stress.    Recently he reports increased stress due to the health condition of his mother.    Review of Systems   Constitutional:  Negative for activity change, appetite change, fatigue and fever.   HENT:  Negative for congestion, ear pain, postnasal drip and rhinorrhea.    Respiratory:  Negative for chest tightness and shortness of breath.    Cardiovascular:  Negative for chest pain, palpitations and leg swelling.   Gastrointestinal:  Negative for abdominal pain, constipation, diarrhea, nausea and vomiting.   Endocrine: Negative for cold intolerance, heat intolerance, polydipsia, polyphagia and polyuria.   Genitourinary:  Negative for decreased urine volume, difficulty urinating, dysuria, enuresis, frequency and urgency.   Musculoskeletal:  Negative for arthralgias, back pain, gait problem and neck pain.   Neurological:  Negative for tremors, seizures, syncope, speech difficulty, weakness, light-headedness and headaches.   Psychiatric/Behavioral:  Negative for confusion, decreased concentration, dysphoric mood and self-injury. The patient is not nervous/anxious and is not hyperactive.    All other systems reviewed and are negative.        No Known Allergies    PHQ Screening   PHQ-9 Total Score: 0 (3/13/2024  4:23 PM)       History  Past Medical History:   Diagnosis Date    Gout     Hypertension        Past Surgical History:   Procedure Laterality Date

## 2024-04-16 ENCOUNTER — HOSPITAL ENCOUNTER (OUTPATIENT)
Facility: HOSPITAL | Age: 39
Setting detail: SPECIMEN
Discharge: HOME | End: 2024-04-19

## 2024-04-16 DIAGNOSIS — E78.00 PURE HYPERCHOLESTEROLEMIA, UNSPECIFIED: ICD-10-CM

## 2024-04-16 DIAGNOSIS — I10 ESSENTIAL (PRIMARY) HYPERTENSION: ICD-10-CM

## 2024-04-16 DIAGNOSIS — Z13.29 SCREENING FOR ENDOCRINE DISORDER: ICD-10-CM

## 2024-04-16 LAB
ALBUMIN SERPL-MCNC: 3.3 G/DL (ref 3.4–5)
ALBUMIN/GLOB SERPL: 1.2 (ref 0.8–1.7)
ALP SERPL-CCNC: 61 U/L (ref 45–117)
ALT SERPL-CCNC: 33 U/L (ref 16–61)
ANION GAP SERPL CALC-SCNC: 3 MMOL/L (ref 3–18)
AST SERPL-CCNC: 29 U/L (ref 10–38)
BASOPHILS # BLD: 0 K/UL (ref 0–0.1)
BASOPHILS NFR BLD: 1 % (ref 0–2)
BILIRUB SERPL-MCNC: 0.6 MG/DL (ref 0.2–1)
BUN SERPL-MCNC: 19 MG/DL (ref 7–18)
BUN/CREAT SERPL: 18 (ref 12–20)
CALCIUM SERPL-MCNC: 9.2 MG/DL (ref 8.5–10.1)
CHLORIDE SERPL-SCNC: 107 MMOL/L (ref 100–111)
CHOLEST SERPL-MCNC: 182 MG/DL
CO2 SERPL-SCNC: 29 MMOL/L (ref 21–32)
CREAT SERPL-MCNC: 1.07 MG/DL (ref 0.6–1.3)
DIFFERENTIAL METHOD BLD: ABNORMAL
EOSINOPHIL # BLD: 0.2 K/UL (ref 0–0.4)
EOSINOPHIL NFR BLD: 3 % (ref 0–5)
ERYTHROCYTE [DISTWIDTH] IN BLOOD BY AUTOMATED COUNT: 14.9 % (ref 11.6–14.5)
EST. AVERAGE GLUCOSE BLD GHB EST-MCNC: 103 MG/DL
GLOBULIN SER CALC-MCNC: 2.8 G/DL (ref 2–4)
GLUCOSE SERPL-MCNC: 93 MG/DL (ref 74–99)
HBA1C MFR BLD: 5.2 % (ref 4.2–5.6)
HCT VFR BLD AUTO: 45.4 % (ref 36–48)
HDLC SERPL-MCNC: 44 MG/DL (ref 40–60)
HDLC SERPL: 4.1 (ref 0–5)
HGB BLD-MCNC: 14.4 G/DL (ref 13–16)
IMM GRANULOCYTES # BLD AUTO: 0 K/UL (ref 0–0.04)
IMM GRANULOCYTES NFR BLD AUTO: 0 % (ref 0–0.5)
LDLC SERPL CALC-MCNC: 108.2 MG/DL (ref 0–100)
LIPID PANEL: ABNORMAL
LYMPHOCYTES # BLD: 1.7 K/UL (ref 0.9–3.6)
LYMPHOCYTES NFR BLD: 29 % (ref 21–52)
MCH RBC QN AUTO: 24.4 PG (ref 24–34)
MCHC RBC AUTO-ENTMCNC: 31.7 G/DL (ref 31–37)
MCV RBC AUTO: 76.8 FL (ref 78–100)
MONOCYTES # BLD: 0.5 K/UL (ref 0.05–1.2)
MONOCYTES NFR BLD: 9 % (ref 3–10)
NEUTS SEG # BLD: 3.4 K/UL (ref 1.8–8)
NEUTS SEG NFR BLD: 58 % (ref 40–73)
NRBC # BLD: 0 K/UL (ref 0–0.01)
NRBC BLD-RTO: 0 PER 100 WBC
PLATELET # BLD AUTO: 252 K/UL (ref 135–420)
PMV BLD AUTO: 8.8 FL (ref 9.2–11.8)
POTASSIUM SERPL-SCNC: 4 MMOL/L (ref 3.5–5.5)
PROT SERPL-MCNC: 6.1 G/DL (ref 6.4–8.2)
RBC # BLD AUTO: 5.91 M/UL (ref 4.35–5.65)
SODIUM SERPL-SCNC: 139 MMOL/L (ref 136–145)
TRIGL SERPL-MCNC: 149 MG/DL
VLDLC SERPL CALC-MCNC: 29.8 MG/DL
WBC # BLD AUTO: 5.8 K/UL (ref 4.6–13.2)

## 2024-04-16 PROCEDURE — 80053 COMPREHEN METABOLIC PANEL: CPT

## 2024-04-16 PROCEDURE — 83036 HEMOGLOBIN GLYCOSYLATED A1C: CPT

## 2024-04-16 PROCEDURE — 36415 COLL VENOUS BLD VENIPUNCTURE: CPT

## 2024-04-16 PROCEDURE — 80061 LIPID PANEL: CPT

## 2024-04-16 PROCEDURE — 85025 COMPLETE CBC W/AUTO DIFF WBC: CPT

## 2024-04-23 ENCOUNTER — TELEPHONE (OUTPATIENT)
Facility: CLINIC | Age: 39
End: 2024-04-23

## 2024-04-23 NOTE — TELEPHONE ENCOUNTER
Patient called he needs the results of his labs. He said if he doesn't answer it's because he can't be on his phone during work,    Ph# [phone].

## 2024-04-24 NOTE — TELEPHONE ENCOUNTER
Please inform patient that his sugars, kidney and liver function were stable..  His cholesterol was mildly elevated with .2

## 2024-04-24 NOTE — TELEPHONE ENCOUNTER
Patient called again asking to be reached out to about the results of his labs. He said if he doesn't answer it's because he can't be on his phone during work, but to please leave a message about his results if possible.     # [phone].

## (undated) DEVICE — SUT ETHLN 5-0 18IN PC3 BLK --

## (undated) DEVICE — SLIM BODY SKIN STAPLER: Brand: APPOSE ULC

## (undated) DEVICE — SUTURE PERMA-HAND SZ 3-0 L24IN NONABSORBABLE BLK W/O NDL SA74H

## (undated) DEVICE — Device

## (undated) DEVICE — ROOM TURNOVER PACK

## (undated) DEVICE — INSULATED BLADE ELECTRODE: Brand: EDGE

## (undated) DEVICE — TRAY PREP DRY W/ PREM GLV 2 APPL 6 SPNG 2 UNDPD 1 OVERWRAP

## (undated) DEVICE — SUT SLK 3-0 30IN SH BLK --

## (undated) DEVICE — DRAPE: MAGNETIC 12X16 30/CS: Brand: MEDICAL ACTION INDUSTRIES

## (undated) DEVICE — AMD ANTIMICROBIAL DRAIN SPONGES, 6 PLY, 0.2% POLYHEXAMETHYLENE BIGUANIDE HCI (PHMB): Brand: EXCILON

## (undated) DEVICE — STIMULATOR 8562010 VARI-STIM 10PK ROHS: Brand: VARI-STIM®

## (undated) DEVICE — SHEET, DRAPE, SPLIT, STERILE: Brand: MEDLINE

## (undated) DEVICE — GAUZE SPONGES,8 PLY: Brand: CURITY

## (undated) DEVICE — STERILE POLYISOPRENE POWDER-FREE SURGICAL GLOVES: Brand: PROTEXIS

## (undated) DEVICE — SUTURE VCRL SZ 3-0 L27IN ABSRB UD L26MM SH 1/2 CIR J416H

## (undated) DEVICE — SPONGE LAP 18X18IN STRL -- 5/PK

## (undated) DEVICE — INTENDED FOR TISSUE SEPARATION, AND OTHER PROCEDURES THAT REQUIRE A SHARP SURGICAL BLADE TO PUNCTURE OR CUT.: Brand: BARD-PARKER ® CARBON RIB-BACK BLADES

## (undated) DEVICE — MEDI-VAC NON-CONDUCTIVE SUCTION TUBING: Brand: CARDINAL HEALTH

## (undated) DEVICE — AIRLIFE™ ADULT CUSHION NASAL CANNULA 14 FOOT (4.3) CRUSH-RESISTANT OXYGEN TUBING, AND U/CONNECT-IT ADAPTER: Brand: AIRLIFE™

## (undated) DEVICE — DRAIN SURG 10FR L1/8IN DIA3.2MM SIL CHN RND FULL FLUT TRCR

## (undated) DEVICE — BIPOLAR FORCEPS CORD: Brand: VALLEYLAB

## (undated) DEVICE — REM POLYHESIVE ADULT PATIENT RETURN ELECTRODE: Brand: VALLEYLAB

## (undated) DEVICE — ROUND DISSECTORS: Brand: DEROYAL

## (undated) DEVICE — SUTURE COAT VCRL SZ 4-0 L18IN ABSRB UD L19MM PS-2 1/2 CIR J496G

## (undated) DEVICE — SUT PROL 6-0 30IN C1 DA BLU --

## (undated) DEVICE — SHEAR HARMONIC FOCUS OEM 9CM --

## (undated) DEVICE — SUT PROL 5-0 18IN P3 BLU --

## (undated) DEVICE — PACK PROCEDURE SURG MAJ W/ BASIN LF

## (undated) DEVICE — KENDALL SCD EXPRESS SLEEVES, KNEE LENGTH, MEDIUM: Brand: KENDALL SCD

## (undated) DEVICE — 3M(TM) TEGADERM(TM) TRANSPARENT FILM DRESSING FRAME STYLE 9505W: Brand: 3M™ TEGADERM™

## (undated) DEVICE — SUTURE PERMA-HAND SZ 2-0 L24IN NONABSORBABLE BLK W/O NDL SA75H

## (undated) DEVICE — SUTURE COAT VCRL PC 5 PRECIS COSM CONVENTIONAL CUT PRIM 3 8 J823H

## (undated) DEVICE — SUT SLK 2-0SH 30IN BLK --